# Patient Record
Sex: MALE | Race: BLACK OR AFRICAN AMERICAN | Employment: OTHER | URBAN - METROPOLITAN AREA
[De-identification: names, ages, dates, MRNs, and addresses within clinical notes are randomized per-mention and may not be internally consistent; named-entity substitution may affect disease eponyms.]

---

## 2017-09-25 ENCOUNTER — OFFICE VISIT (OUTPATIENT)
Dept: ORTHOPEDIC SURGERY | Age: 67
End: 2017-09-25

## 2017-09-25 VITALS
BODY MASS INDEX: 29.4 KG/M2 | HEIGHT: 68 IN | DIASTOLIC BLOOD PRESSURE: 88 MMHG | HEART RATE: 68 BPM | RESPIRATION RATE: 18 BRPM | SYSTOLIC BLOOD PRESSURE: 155 MMHG | TEMPERATURE: 98.2 F | WEIGHT: 194 LBS

## 2017-09-25 DIAGNOSIS — M54.16 LUMBAR RADICULOPATHY: ICD-10-CM

## 2017-09-25 DIAGNOSIS — M54.50 LUMBAR SPINE PAIN: Primary | ICD-10-CM

## 2017-09-25 RX ORDER — TAMSULOSIN HYDROCHLORIDE 0.4 MG/1
CAPSULE ORAL
Refills: 3 | COMMUNITY
Start: 2017-08-02

## 2017-09-25 RX ORDER — ALLOPURINOL 100 MG/1
TABLET ORAL DAILY
COMMUNITY

## 2017-09-25 RX ORDER — SIMVASTATIN 40 MG/1
TABLET, FILM COATED ORAL
COMMUNITY

## 2017-09-25 RX ORDER — LOSARTAN POTASSIUM 100 MG/1
TABLET ORAL
Refills: 5 | COMMUNITY
Start: 2017-09-15

## 2017-09-25 RX ORDER — COLCHICINE 0.6 MG/1
0.6 TABLET ORAL AS NEEDED
COMMUNITY

## 2017-09-25 NOTE — PATIENT INSTRUCTIONS
[employer] Activation    Thank you for requesting access to [employer]. Please follow the instructions below to securely access and download your online medical record. [employer] allows you to send messages to your doctor, view your test results, renew your prescriptions, schedule appointments, and more. How Do I Sign Up? 1. In your internet browser, go to [email or web address]  2. Click on the First Time User? Click Here link in the Sign In box. You will be redirect to the New Member Sign Up page. 3. Enter your [employer] Access Code exactly as it appears below. You will not need to use this code after youve completed the sign-up process. If you do not sign up before the expiration date, you must request a new code. [employer] Access Code: [email or web address]  Expires: 2017  8:31 AM (This is the date your [employer] access code will )    4. Enter the last four digits of your Social Security Number (xxxx) and Date of Birth (mm/dd/yyyy) as indicated and click Submit. You will be taken to the next sign-up page. 5. Create a [employer] ID. This will be your [employer] login ID and cannot be changed, so think of one that is secure and easy to remember. 6. Create a [employer] password. You can change your password at any time. 7. Enter your Password Reset Question and Answer. This can be used at a later time if you forget your password. 8. Enter your e-mail address. You will receive e-mail notification when new information is available in [address]. 9. Click Sign Up. You can now view and download portions of your medical record. 10. Click the Download Summary menu link to download a portable copy of your medical information. Additional Information    If you have questions, please visit the Frequently Asked Questions section of the [employer] website at [email or web address] Remember, [employer] is NOT to be used for urgent needs. For medical emergencies, dial 911.          Learning About Lumbar Epidural Steroid Injections  What is a lumbar epidural steroid injection? A doctor may give you a lumbar epidural steroid injection to try to decrease pain, tingling, or numbness in your back, buttock, or leg. These might be the result of a back or disc problem. The injection goes directly into your epidural space. This is the area in your back around your spinal cord. This injection may have both a local anesthetic and a steroid medicine. Or it may only have a steroid. Local anesthetic medicines numb your nerves right away for a short time. Steroids reduce swelling and pain. But they take a few days to start working. Some people get a series of these injections over weeks or months. How is a lumbar epidural done? The doctor may use an imaging test before or during your injection. This can be an MRI, a CT scan, or an X-ray. These tests can show where your nerve problems are. After finding the right spot, the doctor may inject a numbing medicine into the skin where you will get the steroid injection. Then he or she puts the needle for the steroid into the numbed area. You may feel some pressure. You could feel some stinging or burning during the injection. It takes about 10 to 15 minutes to get this injection. You will probably go home about 20 to 30 minutes after you get it. You will need someone to drive you home. What can you expect after a lumbar epidural?  If your injection had local anesthetic and a steroid, your legs may feel heavy or numb right after. You will probably be able to walk. But you may need to be extra careful. Take care not to lose your balance and be sure to follow your doctor's instructions. If your injection contained local anesthetic, you may feel better right away. But this pain relief will last only a few hours. Your pain will probably return. This is because the steroids have not started working yet. Before the steroids start to work, your back may be sore for a few days.   These injections don't always work. When they do, it takes 1 to 5 days. This pain relief can last for several days to a few months or longer. You may want to do less than normal for a few days. But you may also be able to return to your daily routine. Some people are dizzy or feel sick to their stomach after getting this injection. These symptoms usually do not last very long. If your pain is better, you may be able to keep doing your normal activities or physical therapy. But try not to overdo it, even if your back pain has improved a lot. If your pain is only a little better or if it comes back, your doctor may recommend another injection in a few weeks. If your pain has not changed, talk to your doctor about other treatment choices. Side effects from an epidural steroid injection include headache, fever, or infection. Serious side effects are rare. But they can include stroke, paralysis, or loss of vision. Follow-up care is a key part of your treatment and safety. Be sure to make and go to all appointments, and call your doctor if you are having problems. It's also a good idea to know your test results and keep a list of the medicines you take. Where can you learn more? Go to http://rosey-chris.info/. Enter [place] in the search box to learn more about \"Learning About Lumbar Epidural Steroid Injections. \"  Current as of: March 21, 2017  Content Version: 11.3  © 3936-1424 [employer]. Care instructions adapted under license by [employer] (which disclaims liability or warranty for this information). If you have questions about a medical condition or this instruction, always ask your healthcare professional. [address] any warranty or liability for your use of this information.

## 2017-09-25 NOTE — PROGRESS NOTES
[address]  [address]  [address]  Phone: [phone]  Fax: [phone]        Dulce Orellana  : 1950  PCP: Cathryn Nava MD  2017    NEW PATIENT      ASSESSMENT AND PLAN     Sonia Guerrero comes in to the office today c/o low back pain with sharp radicular symptoms into the right lower extremity. The lumbar MRI showed foraminal stenosis at the bilateral L5 and S1 nerve roots. On the examination, he did not have neurological deficits. His symptoms are right sided. I referred him to get a right L5 and S1 SNRB. I increased his Gabapentin to 600 mg TID. Pt will f/u in 3 weeks or sooner if needed. Diagnoses and all orders for this visit:    1. Lumbar spine pain    2. Lumbar radiculopathy  -     SCHEDULE SURGERY         Follow-up Disposition:  Return in about 3 weeks (around 10/16/2017), or if symptoms worsen or fail to improve. CHIEF COMPLAINT  Sonia Guerrero is seen today in consultation at the request of Cathryn Nava MD for complaints of low back pain. HISTORY OF PRESENT ILLNESS  Sonia Guerrero is a 75 y.o. male c/o low back pain with sharp radicular symptoms into the right lower extremity that has been gradually worsening over the last year. He reports intermittent numbness and tingling in the lower extremity. He notes a recent ED visit approximately 2 months ago due to severe pain located at the right calf which cause ambulatory issues. He is currently on Gabapentin 600 mg BID, which provided mild relief. From a functional standpoint, he is unable to stand for a prolonged period of time. He notes constantly changing positions to relieve his pain. He is a previous patient of Dr. Dylon Tavares who preformed a laminectomy/discectomy at the left L4-5 level. Pt denies any fevers, chills, nausea, vomiting. Pt denies any chest pain and shortness of breath. Pt denies any ear, nose, and throat problems. Pt denies any fecal or urinary incontinence. He is retired. PAST MEDICAL HISTORY   No past medical history on file. No past surgical history on file. MEDICATIONS        ALLERGIES  No Known Allergies       SOCIAL HISTORY    Social History     Social History    Marital status: UNKNOWN     Spouse name: N/A    Number of children: N/A    Years of education: N/A     Social History Main Topics    Smoking status: Former Smoker     Quit date: 1/1/1987    Smokeless tobacco: Never Used    Alcohol use No    Drug use: None    Sexual activity: Not Asked     Other Topics Concern    None     Social History Narrative    None       FAMILY HISTORY  No family history on file. REVIEW OF SYSTEMS  Review of Systems   Constitutional: Negative for chills, diaphoresis, fever, malaise/fatigue and weight loss. Respiratory: Negative for shortness of breath. Cardiovascular: Negative for chest pain and leg swelling. Gastrointestinal: Negative for constipation, nausea and vomiting. Neurological: Negative for dizziness, tingling, seizures, loss of consciousness, weakness and headaches. Psychiatric/Behavioral: The patient does not have insomnia. PHYSICAL EXAMINATION  Visit Vitals    /88    Pulse 68    Temp 98.2 °F (36.8 °C)    Resp 18    Ht 5' 8\" (1.727 m)    Wt 194 lb (88 kg)    BMI 29.5 kg/m2         Pain Assessment  9/25/2017   Location of Pain Leg;Hip;Back; Foot   Location Modifiers Right   Severity of Pain 0   Duration of Pain A few hours   Frequency of Pain Intermittent         Constitutional:  Well developed, well nourished, in no acute distress. Psychiatric: Affect and mood are appropriate. HEENT: Normocephalic, atraumatic. Extraocular movements intact. Integumentary: No rashes or abrasions noted on exposed areas. Cardiovascular: Regular rate and rhythm. Pulmonary: Clear to auscultation bilaterally. SPINE/MUSCULOSKELETAL EXAM    Cervical spine:  Neck is midline. Normal muscle tone. No focal atrophy is noted. ROM pain free. Shoulder ROM intact. No tenderness to palpation. Negative Spurling's sign. Negative Tinel's sign. Negative Howell's sign. Sensation in the bilateral arms grossly intact to light touch. Lumbar spine:  No rash, ecchymosis, or gross obliquity. No fasciculations. No focal atrophy is noted. No pain with hip ROM. Full range of motion. No tenderness to palpation. No tenderness to palpation at the sciatic notch. SI joints non-tender. Trochanters non tender. Sensation in the bilateral legs grossly intact to light touch. MOTOR:      Biceps  Triceps Deltoids Wrist Ext Wrist Flex Hand Intrin   Right 5/5 5/5 5/5 5/5 5/5 5/5   Left 5/5 5/5 5/5 5/5 5/5 5/5             Hip Flex  Quads Hamstrings Ankle DF EHL Ankle PF   Right 5/5 5/5 5/5 5/5 5/5 5/5   Left 5/5 5/5 5/5 5/5 5/5 5/5     DTRs are 1+ biceps, triceps, brachioradialis, patella, and Achilles. Negative Straight Leg raise. Squat not tested. No difficulty with tandem gait. Ambulation without assistive device. FWB. RADIOGRAPHS  Lumbar MRI images taken on 08/25/2017 personally reviewed with patient:  Impression:  1. Moderate circumferential disc bulge and endplate spondylosis at L4-L5 with impingement of left L5 and abutment of descending right L5 nerve roots. Severe right and left foraminal stenosis, each with impingement of the respective L4 foraminal nerve roots. 2. Moderate multifactorial canal stenosis at L5-S1 without descending S1 nerve root impingement. Severe right and left L5-S1 foraminal stenosis with impingement of left and right L5 foraminal nerve roots.  reviewed    Mr. Rony Chavez has a reminder for a \"due or due soon\" health maintenance. I have asked that he contact his primary care provider for follow-up on this health maintenance. This plan was reviewed with the patient and patient agrees. All questions were answered. More than half of this visit today was spent on counseling. Written by Faustino Montgomery, as dictated by Dr. Britta Rojas. I, Dr. Britta Rojas, confirm that all documentation is accurate.

## 2017-09-25 NOTE — MR AVS SNAPSHOT
Visit Information Date & Time Provider Department Dept. Phone Encounter #  
 9/25/2017  9:00 AM Pawel Mensah MD [facility] [phone] [id number] Follow-up Instructions Return in about 3 weeks (around 10/16/2017), or if symptoms worsen or fail to improve. Follow-up and Disposition History Upcoming Health Maintenance Date Due Hepatitis C Screening 1950 DTaP/Tdap/Td series (1 - Tdap) 2/10/1970 FOBT Q 1 YEAR AGE 50-75 2/10/1999 ZOSTER VACCINE AGE 60> 12/10/2008 GLAUCOMA SCREENING Q2Y 2/10/2014 Pneumococcal 65+ Low/Medium Risk (1 of 2 - PCV13) 2/10/2014 MEDICARE YEARLY EXAM 2/10/2014 INFLUENZA AGE 9 TO ADULT 8/1/2017 Allergies as of 9/25/2017  Review Complete On: 9/25/2017 By: Pawel Mensah MD  
 No Known Allergies Current Immunizations  Never Reviewed No immunizations on file. Not reviewed this visit You Were Diagnosed With   
  
 Codes Comments Lumbar spine pain    -  Primary ICD-10-CM: M54.5 ICD-9-CM: 724.2 Lumbar radiculopathy     ICD-10-CM: M54.16 
ICD-9-CM: 724.4 Vitals BP Pulse Temp Resp Height(growth percentile) Weight(growth percentile) 155/88 68 98.2 °F (36.8 °C) 18 5' 8\" (1.727 m) 194 lb (88 kg) BMI Smoking Status 29.5 kg/m2 Former Smoker BMI and BSA Data Body Mass Index Body Surface Area  
 29.5 kg/m 2 2.05 m 2 Preferred Pharmacy Pharmacy Name Phone [address], [address] [facility] 22 [address] [facility] [phone] Your Updated Medication List  
  
   
This list is accurate as of: 9/25/17 10:30 AM.  Always use your most recent med list.  
  
  
  
  
 allopurinol 100 mg tablet Commonly known as:  Kenyatta Groom Take  by mouth daily. colchicine 0.6 mg tablet Take 0.6 mg by mouth daily. losartan 100 mg tablet Commonly known as:  COZAAR TK 1 T PO QD  
  
 simvastatin 40 mg tablet Commonly known as:  ZOCOR Take  by mouth nightly. tamsulosin 0.4 mg capsule Commonly known as:  FLOMAX TK 1 C PO D BEFORE SUPPER We Performed the Following SCHEDULE SURGERY [FCD6009 Custom] Follow-up Instructions Return in about 3 weeks (around 10/16/2017), or if symptoms worsen or fail to improve. Patient Instructions [employer]Nav [employer] Activation Thank you for requesting access to [employer]. Please follow the instructions below to securely access and download your online medical record. [employer] allows you to send messages to your doctor, view your test results, renew your prescriptions, schedule appointments, and more. How Do I Sign Up? 1. In your internet browser, go to [email or web address] 
2. Click on the First Time User? Click Here link in the Sign In box. You will be redirect to the New Member Sign Up page. 3. Enter your [employer] Access Code exactly as it appears below. You will not need to use this code after youve completed the sign-up process. If you do not sign up before the expiration date, you must request a new code. [employer] Access Code: [email or web address] Expires: 2017  8:31 AM (This is the date your [employer] access code will ) 4. Enter the last four digits of your Social Security Number (xxxx) and Date of Birth (mm/dd/yyyy) as indicated and click Submit. You will be taken to the next sign-up page. 5. Create a [employer] ID. This will be your [employer] login ID and cannot be changed, so think of one that is secure and easy to remember. 6. Create a [employer] password. You can change your password at any time. 7. Enter your Password Reset Question and Answer. This can be used at a later time if you forget your password. 8. Enter your e-mail address. You will receive e-mail notification when new information is available in [address] 9. Click Sign Up. You can now view and download portions of your medical record. 10. Click the Download Summary menu link to download a portable copy of your medical information. Additional Information If you have questions, please visit the Frequently Asked Questions section of the [employer] website at https://[email or web address] Internet Services. Kid Bunch/Net[employer]s Internet Services/. Remember, [employer] is NOT to be used for urgent needs. For medical emergencies, dial 911. Learning About Lumbar Epidural Steroid Injections What is a lumbar epidural steroid injection? A doctor may give you a lumbar epidural steroid injection to try to decrease pain, tingling, or numbness in your back, buttock, or leg. These might be the result of a back or disc problem. The injection goes directly into your epidural space. This is the area in your back around your spinal cord. This injection may have both a local anesthetic and a steroid medicine. Or it may only have a steroid. Local anesthetic medicines numb your nerves right away for a short time. Steroids reduce swelling and pain. But they take a few days to start working. Some people get a series of these injections over weeks or months. How is a lumbar epidural done? The doctor may use an imaging test before or during your injection. This can be an MRI, a CT scan, or an X-ray. These tests can show where your nerve problems are. After finding the right spot, the doctor may inject a numbing medicine into the skin where you will get the steroid injection. Then he or she puts the needle for the steroid into the numbed area. You may feel some pressure. You could feel some stinging or burning during the injection. It takes about 10 to 15 minutes to get this injection. You will probably go home about 20 to 30 minutes after you get it. You will need someone to drive you home. What can you expect after a lumbar epidural? 
If your injection had local anesthetic and a steroid, your legs may feel heavy or numb right after. You will probably be able to walk.  But you may need to be extra careful. Take care not to lose your balance and be sure to follow your doctor's instructions. If your injection contained local anesthetic, you may feel better right away. But this pain relief will last only a few hours. Your pain will probably return. This is because the steroids have not started working yet. Before the steroids start to work, your back may be sore for a few days. These injections don't always work. When they do, it takes 1 to 5 days. This pain relief can last for several days to a few months or longer. You may want to do less than normal for a few days. But you may also be able to return to your daily routine. Some people are dizzy or feel sick to their stomach after getting this injection. These symptoms usually do not last very long. If your pain is better, you may be able to keep doing your normal activities or physical therapy. But try not to overdo it, even if your back pain has improved a lot. If your pain is only a little better or if it comes back, your doctor may recommend another injection in a few weeks. If your pain has not changed, talk to your doctor about other treatment choices. Side effects from an epidural steroid injection include headache, fever, or infection. Serious side effects are rare. But they can include stroke, paralysis, or loss of vision. Follow-up care is a key part of your treatment and safety. Be sure to make and go to all appointments, and call your doctor if you are having problems. It's also a good idea to know your test results and keep a list of the medicines you take. Where can you learn more? Go to http://rosey-chris.info/. Enter Lynnette Eddy in the search box to learn more about \"Learning About Lumbar Epidural Steroid Injections. \" Current as of: March 21, 2017 Content Version: 11.3 © 3302-4834 [employer].  Care instructions adapted under license by [address] (which disclaims liability or warranty for this information). If you have questions about a medical condition or this instruction, always ask your healthcare professional. [address] any warranty or liability for your use of this information. Patient Instructions History Introducing \[facility]\"" & HEALTH SERVICES! Syeda Jacome introduces Chatous patient portal. Now you can access parts of your medical record, email your doctor's office, and request medication refills online. 1. In your internet browser, go to [email or web address] 2. Click on the First Time User? Click Here link in the Sign In box. You will see the New Member Sign Up page. 3. Enter your Chatous Access Code exactly as it appears below. You will not need to use this code after youve completed the sign-up process. If you do not sign up before the expiration date, you must request a new code. · Chat[employer] Access Code: [email or web address] Expires: 12/24/2017  8:31 AM 
 
4. Enter the last four digits of your Social Security Number (xxxx) and Date of Birth (mm/dd/yyyy) as indicated and click Submit. You will be taken to the next sign-up page. 5. Create a Chatous ID. This will be your Chatous login ID and cannot be changed, so think of one that is secure and easy to remember. 6. Create a Chatous password. You can change your password at any time. 7. Enter your Password Reset Question and Answer. This can be used at a later time if you forget your password. 8. Enter your e-mail address. You will receive e-mail notification when new information is available in [address] 9. Click Sign Up. You can now view and download portions of your medical record. 10. Click the Download Summary menu link to download a portable copy of your medical information.  
 
If you have questions, please visit the Frequently Asked Questions section of the [employer]. Remember, [employer] is NOT to be used for urgent needs. For medical emergencies, dial 911. Now available from your iPhone and Android! Please provide this summary of care documentation to your next provider. Your primary care clinician is listed as Sol Mena. If you have any questions after today's visit, please call [phone].

## 2017-10-23 ENCOUNTER — TELEPHONE (OUTPATIENT)
Dept: ORTHOPEDIC SURGERY | Age: 67
End: 2017-10-23

## 2017-10-23 NOTE — TELEPHONE ENCOUNTER
Patient called requesting a call back from Dr. Kaye Hedrick. He said it's in regards to his spinal block scheduled for 10/25/17. Please advise patient at [phone] or [phone].

## 2017-10-25 ENCOUNTER — APPOINTMENT (OUTPATIENT)
Dept: GENERAL RADIOLOGY | Age: 67
End: 2017-10-25
Attending: PHYSICAL MEDICINE & REHABILITATION
Payer: MEDICARE

## 2017-10-25 ENCOUNTER — HOSPITAL ENCOUNTER (OUTPATIENT)
Age: 67
Setting detail: OUTPATIENT SURGERY
Discharge: HOME | End: 2017-10-25
Attending: PHYSICAL MEDICINE & REHABILITATION | Admitting: PHYSICAL MEDICINE & REHABILITATION
Payer: MEDICARE

## 2017-10-25 VITALS
BODY MASS INDEX: 29.4 KG/M2 | RESPIRATION RATE: 16 BRPM | SYSTOLIC BLOOD PRESSURE: 143 MMHG | HEART RATE: 51 BPM | WEIGHT: 194 LBS | HEIGHT: 68 IN | DIASTOLIC BLOOD PRESSURE: 95 MMHG | OXYGEN SATURATION: 100 % | TEMPERATURE: 98.1 F

## 2017-10-25 PROBLEM — M54.16 LUMBAR RADICULOPATHY: Status: ACTIVE | Noted: 2017-10-25

## 2017-10-25 PROCEDURE — 74011250636 HC RX REV CODE- 250/636

## 2017-10-25 PROCEDURE — 77030003669 HC NDL SPN COOK -B: Performed by: PHYSICAL MEDICINE & REHABILITATION

## 2017-10-25 PROCEDURE — 76010000009 HC PAIN MGT 0 TO 30 MIN PROC: Performed by: PHYSICAL MEDICINE & REHABILITATION

## 2017-10-25 PROCEDURE — 74011636320 HC RX REV CODE- 636/320

## 2017-10-25 PROCEDURE — 74011000250 HC RX REV CODE- 250: Performed by: PHYSICAL MEDICINE & REHABILITATION

## 2017-10-25 PROCEDURE — 77030003676 HC NDL SPN MPRI -A: Performed by: PHYSICAL MEDICINE & REHABILITATION

## 2017-10-25 PROCEDURE — 74011250636 HC RX REV CODE- 250/636: Performed by: PHYSICAL MEDICINE & REHABILITATION

## 2017-10-25 PROCEDURE — 74011000250 HC RX REV CODE- 250

## 2017-10-25 PROCEDURE — 74011636320 HC RX REV CODE- 636/320: Performed by: PHYSICAL MEDICINE & REHABILITATION

## 2017-10-25 RX ORDER — GUAIFENESIN 100 MG/5ML
81 LIQUID (ML) ORAL DAILY
COMMUNITY

## 2017-10-25 RX ORDER — DEXAMETHASONE SODIUM PHOSPHATE 100 MG/10ML
INJECTION INTRAMUSCULAR; INTRAVENOUS AS NEEDED
Status: DISCONTINUED | OUTPATIENT
Start: 2017-10-25 | End: 2017-10-25 | Stop reason: HOSPADM

## 2017-10-25 RX ORDER — DIAZEPAM 5 MG/1
5-20 TABLET ORAL ONCE
Status: DISCONTINUED | OUTPATIENT
Start: 2017-10-25 | End: 2017-10-25 | Stop reason: HOSPADM

## 2017-10-25 RX ORDER — LIDOCAINE HYDROCHLORIDE 10 MG/ML
INJECTION, SOLUTION EPIDURAL; INFILTRATION; INTRACAUDAL; PERINEURAL AS NEEDED
Status: DISCONTINUED | OUTPATIENT
Start: 2017-10-25 | End: 2017-10-25 | Stop reason: HOSPADM

## 2017-10-25 NOTE — DISCHARGE INSTRUCTIONS
[facility]   ([place])  Dr. Maria G Duvall, Dr. Richelle Hensley, Dr. Radha Boudreaux not drive a car, operate heavy machinery or dangerous equipment for 24 hours. * Activity as tolerated; rest for the remainder of the day. * Resume pre-block medications including those for your family doctor. * Do not drink alcoholic beverages for 24 hours. Alcohol and the medications you have received may interact and cause an adverse reaction. * You may feel better this evening and worse tomorrow, as the numbing medications wears off and the steroid has yet to begin to work. After 48 hrs the steroid should begin to release bringing you relief. * You may shower this evening and remove any bandages. * Avoid hot tubs and heating pads for 24 hours. You may use cold packs on the procedure site as tolerated for the first 24 hours. * If a headache develops, drink plenty of fluids and rest.  Take over the counter medications for headache if needed. If the headache continues longer than 24 hours, call MD at the [address] [place] [address]. [phone]    * Continue taking pain medications as needed. * You may resume your regular diet if tolerated. Otherwise, start with sips of water and advance slowly. * If Diabetic: check your blood sugar three times a day for the next 3 days. If your sugar is greater than 300 call your family doctor. If your sugar is greater than 400, have someone transport you to the nearest Emergency Room. * If you experience any of the following problems, Please Call the [address] [place] [address] at [phone].         * Shortness of Breath    * Fever of 101 or higher    * Nausea / Vomiting    * Severe Headache    * Weakness or numbness in arms or legs that is not      resolving    * Prolonged increase in pain greater than 4 days      DISCHARGE SUMMARY from Nurse      PATIENT INSTRUCTIONS:    After oral sedation, for 24 hours or while taking prescription Narcotics:  · Limit your activities  · Do not drive and operate hazardous machinery  · Do not make important personal or business decisions  · Do  not drink alcoholic beverages  · If you have not urinated within 8 hours after discharge, please contact your surgeon on call. Report the following to your surgeon:  · Excessive pain, swelling, redness or odor of or around the surgical area  · Temperature over 101  · Nausea and vomiting lasting longer than 4 hours or if unable to take medications  · Any signs of decreased circulation or nerve impairment to extremity: change in color, persistent  numbness, tingling, coldness or increase pain  · Any questions            What to do at Home:  Recommended activity: Activity as tolerated, NO DRIVING FOR 12 Hours post injection          *  Please give a list of your current medications to your Primary Care Provider. *  Please update this list whenever your medications are discontinued, doses are      changed, or new medications (including over-the-counter products) are added. *  Please carry medication information at all times in case of emergency situations. These are general instructions for a healthy lifestyle:    No smoking/ No tobacco products/ Avoid exposure to second hand smoke    Surgeon General's Warning:  Quitting smoking now greatly reduces serious risk to your health. Obesity, smoking, and sedentary lifestyle greatly increases your risk for illness    A healthy diet, regular physical exercise & weight monitoring are important for maintaining a healthy lifestyle    You may be retaining fluid if you have a history of heart failure or if you experience any of the following symptoms:  Weight gain of 3 pounds or more overnight or 5 pounds in a week, increased swelling in our hands or feet or shortness of breath while lying flat in bed.   Please call your doctor as soon as you notice any of these symptoms; do not wait until your next office visit. Recognize signs and symptoms of STROKE:    F-face looks uneven    A-arms unable to move or move unevenly    S-speech slurred or non-existent    T-time-call 911 as soon as signs and symptoms begin-DO NOT go       Back to bed or wait to see if you get better-TIME IS BRAIN. MyChart Activation    Thank you for requesting access to [employer]. Please follow the instructions below to securely access and download your online medical record. [employer] allows you to send messages to your doctor, view your test results, renew your prescriptions, schedule appointments, and more. How Do I Sign Up? 1. In your internet browser, go to [email or web address]  2. Click on the First Time User? Click Here link in the Sign In box. You will be redirect to the New Member Sign Up page. 3. Enter your [employer] Access Code exactly as it appears below. You will not need to use this code after youve completed the sign-up process. If you do not sign up before the expiration date, you must request a new code. [employer] Access Code: [email or web address]  Expires: 2017  8:31 AM (This is the date your [employer] access code will )    4. Enter the last four digits of your Social Security Number (xxxx) and Date of Birth (mm/dd/yyyy) as indicated and click Submit. You will be taken to the next sign-up page. 5. Create a [employer] ID. This will be your [employer] login ID and cannot be changed, so think of one that is secure and easy to remember. 6. Create a [employer] password. You can change your password at any time. 7. Enter your Password Reset Question and Answer. This can be used at a later time if you forget your password. 8. Enter your e-mail address. You will receive e-mail notification when new information is available in [address] 9. Click Sign Up. You can now view and download portions of your medical record.   10. Click the Download Summary menu link to download a portable copy of your medical information. Additional Information    If you have questions, please visit the Frequently Asked Questions section of the [employer] website at [email or web address] Remember, [employer] is NOT to be used for urgent needs. For medical emergencies, dial 911.

## 2017-10-25 NOTE — PROCEDURES
PROCEDURE NOTE      Patient Name: Casey Johnson    Date of Procedure: October 25, 2017    Preoperative Diagnosis:  Lumbar radiculopathy [M54.16]    Post Operative Diagnosis:  Lumbar radiculopathy [M54.16]    Procedure :    right L5 Selective Nerve Root Block  right S1 Selective Nerve Root Block      Consent:  Informed consent was obtained prior to the procedure. The patient was given the opportunity to ask questions regarding the procedure and its associated risks. In addition to the potential risks associated with the procedure itself, the patient was informed both verbally and in writing of the potential side effects of the use of glucocorticoid. The patient appeared to comprehend the informed consent and desired to have the procedure performed. Procedure: The patient was placed in the prone position on the fluoroscopy table and the back was prepped and draped in the usual sterile manner. The exact spinal level was  identified using fluoroscopy, and Lidocaine 1 % was injected locally, a # 22 gauge spinal needle was passed to the transverse process. The depth was noted and the needle redirected to pass inferior and approximately one cm anterior to the transverse process. YES  1 cc of Isovue M-200 was used to verify positioning in the epidural and paravertebral space and outlined the course of the spinal nerve into the epidural space. The same procedure was repeated at each spinal level indicated above. A total of 30 mg of preservative free Dexamethasone and 5 cc of Lidocaine was slowly injected. The patient tolerated the procedure well. The injection area was cleaned and bandaids applied. Not excessive bleeding was noted. Patient dressed and discharged to home with instructions. Discussion: The patient tolerated the procedure well.                                               Sara Carrillo MD  October 25, 2017

## 2017-10-25 NOTE — H&P
Date of Surgery Update:  Be Britton was seen and examined. History and physical has been reviewed. The patient has been examined. There have been no significant clinical changes since the completion of the last office visit.       Signed By: Xin Jernigan MD     October 25, 2017 12:18 PM

## 2017-10-26 NOTE — TELEPHONE ENCOUNTER
Called patient, apologized for delay in response. Patient expressed that he really wanted to speak with MD and it was explained to him how the messages are handled. He was not happy about office process and an apology was given to patient. He was asked about his current status after the procedure, he stated, he is doing well.  He didn't want to elaborate on what he was previously wanting to ask the MD. He will f/u on 11/08/17

## 2017-11-08 ENCOUNTER — OFFICE VISIT (OUTPATIENT)
Dept: ORTHOPEDIC SURGERY | Age: 67
End: 2017-11-08

## 2017-11-08 VITALS
WEIGHT: 192.4 LBS | HEART RATE: 60 BPM | DIASTOLIC BLOOD PRESSURE: 95 MMHG | BODY MASS INDEX: 29.25 KG/M2 | TEMPERATURE: 97.5 F | SYSTOLIC BLOOD PRESSURE: 181 MMHG | OXYGEN SATURATION: 99 %

## 2017-11-08 DIAGNOSIS — M54.16 LUMBAR RADICULOPATHY: Primary | ICD-10-CM

## 2017-11-08 RX ORDER — GABAPENTIN 300 MG/1
CAPSULE ORAL
COMMUNITY
Start: 2017-08-02 | End: 2018-02-05 | Stop reason: SDUPTHER

## 2017-11-08 NOTE — PATIENT INSTRUCTIONS

## 2017-11-08 NOTE — PROGRESS NOTES
[address]  [place], [address] [place] [address] Street  Phone: [phone]  Fax: [phone]        Esmer Badillo  : 1950  PCP: Dasia Alberto MD  2017    PROGRESS NOTE      ASSESSMENT AND PLAN    Carrol Ham comes in to the office today for a right L5 and S1 SNRB f/u. He was primarily treated for lumbar radiculopathy. He is currently asymptomatic. At this time, we discussed tapering off of the Gabapentin. For the next two weeks he will take 300 mg TID then discontinue fully. I advised him to start a general exercise program.     Pt will f/u in 3 months or sooner as needed. Diagnoses and all orders for this visit:    1. Lumbar radiculopathy       Follow-up Disposition:  Return in about 3 months (around 2018), or if symptoms worsen or fail to improve. HISTORY OF PRESENT ILLNESS  Carrol Ham is a 75 y.o. male. A&P / HPI from 2017:  Allyson Gaines comes in to the office today c/o low back pain with sharp radicular symptoms into the right lower extremity.      The lumbar MRI showed foraminal stenosis at the bilateral L5 and S1 nerve roots. On the examination, he did not have neurological deficits. His symptoms are right sided.     I referred him to get a right L5 and S1 SNRB. I increased his Gabapentin to 600 mg TID. HISTORY OF PRESENT ILLNESS  Allyson Gaines is a 75 y.o. male c/o low back pain with sharp radicular symptoms into the right lower extremity that has been gradually worsening over the last year. He reports intermittent numbness and tingling in the lower extremity. He notes a recent ED visit approximately 2 months ago due to severe pain located at the right calf which cause ambulatory issues. He is currently on Gabapentin 600 mg BID, which provided mild relief. From a functional standpoint, he is unable to stand for a prolonged period of time. He notes constantly changing positions to relieve his pain.  He is a previous patient of Dr. Bambi Vasquez who preformed a laminectomy/discectomy at the left L4-5 level.      Pt denies any fevers, chills, nausea, vomiting. Pt denies any chest pain and shortness of breath. Pt denies any ear, nose, and throat problems. Pt denies any fecal or urinary incontinence.      He is retired.        Updates from 11/08/17:  Pt presents for a right L5 and S1 SNRB f/u. The procedure provided continue long term relief. His pain today is rated at an 0/10. PAST MEDICAL HISTORY   Past Medical History:   Diagnosis Date    Gout     High cholesterol     Hypertension        Past Surgical History:   Procedure Laterality Date    HX GI      hemorroidectomy    HX OTHER SURGICAL      back surgery bulged disc   . MEDICATIONS      Current Outpatient Prescriptions   Medication Sig Dispense Refill    gabapentin (NEURONTIN) 300 mg capsule TAKE 1 CAPSULE BY MOUTH THREE TIMES DAILY      aspirin 81 mg chewable tablet Take 81 mg by mouth daily.  losartan (COZAAR) 100 mg tablet TK 1 T PO QD  5    tamsulosin (FLOMAX) 0.4 mg capsule TK 1 C PO D BEFORE SUPPER  3    simvastatin (ZOCOR) 40 mg tablet Take  by mouth nightly.  allopurinol (ZYLOPRIM) 100 mg tablet Take  by mouth daily.  colchicine 0.6 mg tablet Take 0.6 mg by mouth daily. ALLERGIES  No Known Allergies       SOCIAL HISTORY    Social History     Social History    Marital status:      Spouse name: N/A    Number of children: N/A    Years of education: N/A     Social History Main Topics    Smoking status: Former Smoker     Quit date: 1/1/1987    Smokeless tobacco: Never Used    Alcohol use No    Drug use: None    Sexual activity: Not Asked     Other Topics Concern    None     Social History Narrative       FAMILY HISTORY  History reviewed. No pertinent family history. REVIEW OF SYSTEMS  Review of Systems   Constitutional: Negative for chills, diaphoresis, fever, malaise/fatigue and weight loss. Respiratory: Negative for shortness of breath. Cardiovascular: Negative for chest pain and leg swelling. Gastrointestinal: Negative for constipation, nausea and vomiting. Neurological: Negative for dizziness, tingling, seizures, loss of consciousness, weakness and headaches. Psychiatric/Behavioral: The patient does not have insomnia. PHYSICAL EXAMINATION  Visit Vitals    BP (!) 181/95    Pulse 60    Temp 97.5 °F (36.4 °C) (Oral)    Wt 192 lb 6.4 oz (87.3 kg)    SpO2 99%    BMI 29.25 kg/m2       Pain Assessment  11/8/2017   Location of Pain Back   Location Modifiers -   Severity of Pain 0   Duration of Pain -   Frequency of Pain Intermittent   Result of Injury No           Constitutional:  Well developed, well nourished, in no acute distress. Psychiatric: Affect and mood are appropriate. Integumentary: No rashes or abrasions noted on exposed areas. SPINE/MUSCULOSKELETAL EXAM    Cervical spine:  Neck is midline. Normal muscle tone. No focal atrophy is noted. ROM pain free. Shoulder ROM intact. No tenderness to palpation. Negative Spurling's sign. Negative Tinel's sign. Negative Howell's sign.       Sensation in the bilateral arms grossly intact to light touch.      Lumbar spine:  No rash, ecchymosis, or gross obliquity. No fasciculations. No focal atrophy is noted. No pain with hip ROM. Full range of motion. No tenderness to palpation. No tenderness to palpation at the sciatic notch. SI joints non-tender. Trochanters non tender.      Sensation in the bilateral legs grossly intact to light touch.     MOTOR:      Biceps  Triceps Deltoids Wrist Ext Wrist Flex Hand Intrin   Right 5/5 5/5 5/5 5/5 5/5 5/5   Left 5/5 5/5 5/5 5/5 5/5 5/5             Hip Flex  Quads Hamstrings Ankle DF EHL Ankle PF   Right 5/5 5/5 5/5 5/5 5/5 5/5   Left 5/5 5/5 5/5 5/5 5/5 5/5     DTRs are 1+ biceps, triceps, brachioradialis, patella, and Achilles.     Negative Straight Leg raise.   Squat not tested. No difficulty with tandem gait.      Ambulation without assistive device. FWB.       RADIOGRAPHS  Lumbar MRI images taken on 08/25/2017 personally reviewed with patient:  Impression:  1. Moderate circumferential disc bulge and endplate spondylosis at L4-L5 with impingement of left L5 and abutment of descending right L5 nerve roots. Severe right and left foraminal stenosis, each with impingement of the respective L4 foraminal nerve roots. 2. Moderate multifactorial canal stenosis at L5-S1 without descending S1 nerve root impingement. Severe right and left L5-S1 foraminal stenosis with impingement of left and right L5 foraminal nerve roots.       reviewed     Mr. Maine Mccurdy has a reminder for a \"due or due soon\" health maintenance. I have asked that he contact his primary care provider for follow-up on this health maintenance.      This plan was reviewed with the patient and patient agrees. All questions were answered. More than half of this visit today was spent on counseling.     Written by Stan Cervantes, as dictated by Dr. Rooney Essex, Dr. Ashley Hickman, confirm that all documentation is accurate.

## 2018-02-05 ENCOUNTER — OFFICE VISIT (OUTPATIENT)
Dept: ORTHOPEDIC SURGERY | Age: 68
End: 2018-02-05

## 2018-02-05 VITALS
BODY MASS INDEX: 30.1 KG/M2 | SYSTOLIC BLOOD PRESSURE: 154 MMHG | RESPIRATION RATE: 17 BRPM | TEMPERATURE: 97.9 F | WEIGHT: 198.6 LBS | HEART RATE: 60 BPM | DIASTOLIC BLOOD PRESSURE: 106 MMHG | OXYGEN SATURATION: 97 % | HEIGHT: 68 IN

## 2018-02-05 DIAGNOSIS — M54.12 CERVICAL RADICULOPATHY: Primary | ICD-10-CM

## 2018-02-05 RX ORDER — PREDNISONE 10 MG/1
10 TABLET ORAL SEE ADMIN INSTRUCTIONS
Qty: 21 TAB | Refills: 0 | Status: SHIPPED | OUTPATIENT
Start: 2018-02-05 | End: 2018-03-26 | Stop reason: ALTCHOICE

## 2018-02-05 RX ORDER — GABAPENTIN 300 MG/1
300 CAPSULE ORAL 3 TIMES DAILY
Qty: 90 CAP | Refills: 2 | Status: SHIPPED | OUTPATIENT
Start: 2018-02-05 | End: 2018-08-28 | Stop reason: SDUPTHER

## 2018-02-05 NOTE — PROGRESS NOTES
[address]  [address], [address]  [place], [address] [place] [address] Street  Phone: [phone]  Fax: [phone]        Artis Hard  : 1950  PCP: Rony Stout MD  2018    PROGRESS NOTE      ASSESSMENT AND PLAN    Seven Lazo comes in to the office today for left arm pain in a roughly C6 distribution. MRI shows multilevel foraminal stenosis; oddly moreso on the R while his symptoms are L-sided. He has clear complaints of C6/C7 radicular pain without any weakness or loss of sensation. Based on MRI results and symptoms, I discussed physical therapy vs SNRB, but he deferred SNRB for now. Advised him to pursue more aggressive treatment if he begins to experience weakness or loss of sensation. Will refer to physical therapy for MDT and increase gabapentin to TID and a prednisone pack. Pt will f/u in 6 weeks or sooner as needed. Diagnoses and all orders for this visit:    1. Cervical radiculopathy  -     REFERRAL TO PHYSICAL THERAPY         Follow-up Disposition: Not on File      HISTORY OF PRESENT ILLNESS  Seven Lazo is a 75 y.o. male. A&P / HPI from 2017:  Kenyatta Ramos comes in to the office today c/o low back pain with sharp radicular symptoms into the right lower extremity.       The lumbar MRI showed foraminal stenosis at the bilateral L5 and S1 nerve roots. On the examination, he did not have neurological deficits. His symptoms are right sided.      I referred him to get a right L5 and S1 SNRB. I increased his Gabapentin to 600 mg TID. Updates from 17:  Pt presents for a right L5 and S1 SNRB f/u. The procedure provided continue long term relief. His pain today is rated at an 0/10. Updates from 18:  Pt presents for pain radiating into his left arm in a C6 distribution. His pain has been intermittent for over a year, worse and more frequent in the past several weeks.  He reports his pain worsened when he decreased his gabapentin to BID. PAST MEDICAL HISTORY   Past Medical History:   Diagnosis Date    Gout     High cholesterol     Hypertension        Past Surgical History:   Procedure Laterality Date    HX GI      hemorroidectomy    HX OTHER SURGICAL      back surgery bulged disc   . MEDICATIONS    Current Outpatient Prescriptions   Medication Sig Dispense Refill    gabapentin (NEURONTIN) 300 mg capsule TAKE 1 CAPSULE BY MOUTH THREE TIMES DAILY      aspirin 81 mg chewable tablet Take 81 mg by mouth daily.  losartan (COZAAR) 100 mg tablet TK 1 T PO QD  5    tamsulosin (FLOMAX) 0.4 mg capsule TK 1 C PO D BEFORE SUPPER  3    simvastatin (ZOCOR) 40 mg tablet Take  by mouth nightly.  allopurinol (ZYLOPRIM) 100 mg tablet Take  by mouth daily.  colchicine 0.6 mg tablet Take 0.6 mg by mouth daily. ALLERGIES  No Known Allergies       SOCIAL HISTORY    Social History     Social History    Marital status:      Spouse name: N/A    Number of children: N/A    Years of education: N/A     Social History Main Topics    Smoking status: Former Smoker     Quit date: 1/1/1987    Smokeless tobacco: Never Used    Alcohol use No    Drug use: Not on file    Sexual activity: Not on file     Other Topics Concern    Not on file     Social History Narrative       FAMILY HISTORY  No family history on file. REVIEW OF SYSTEMS  Review of Systems   Neurological:        Radicular pain in L arm          PHYSICAL EXAMINATION  There were no vitals taken for this visit. Pain Assessment  11/8/2017   Location of Pain Back   Location Modifiers -   Severity of Pain 0   Duration of Pain -   Frequency of Pain Intermittent   Result of Injury No           Constitutional:  Well developed, well nourished, in no acute distress. Psychiatric: Affect and mood are appropriate. Integumentary: No rashes or abrasions noted on exposed areas. SPINE/MUSCULOSKELETAL EXAM  Cervical spine:  Neck is midline. Normal muscle tone. No focal atrophy is noted. ROM pain free. Shoulder ROM intact. No tenderness to palpation. Positive Left Spurling's sign. Negative Tinel's sign. Negative Howell's sign.       Sensation in the bilateral arms grossly intact to light touch.         Lumbar spine:  No rash, ecchymosis, or gross obliquity. No fasciculations. No focal atrophy is noted. No pain with hip ROM. Full range of motion. No tenderness to palpation. No tenderness to palpation at the sciatic notch. SI joints non-tender. Trochanters non tender.      Sensation in the bilateral legs grossly intact to light touch. MOTOR:      Biceps  Triceps Deltoids Wrist Ext Wrist Flex Hand Intrin   Right 5/5 5/5 5/5 5/5 5/5 5/5   Left 5/5 5/5 5/5 5/5 5/5 5/5             Hip Flex  Quads Hamstrings Ankle DF EHL Ankle PF   Right 5/5 5/5 5/5 5/5 5/5 5/5   Left 5/5 5/5 5/5 5/5 5/5 5/5     Cervical Spine MRI from 1/18/18 reviewed with patient in office today:  1. Multilevel disc disease producing various degree of canal stenosis, overall relatively mild. No moderate or high-grade stenosis. 2. Moderate to marked foraminal stenosis at multiple levels. Particular concern for nerve root impingement at C3, C5, C6, C7 on the R, and C7 on the L.   3. High T2 signal cleft through the anterior C5-6 disc suggesting a tear of the disc as may be seen with a hyperextension injury, although there are no other stigmata of such.      Written by Micha Alejandro as dictated by Amos Gilford, MD

## 2018-02-05 NOTE — MR AVS SNAPSHOT
[address] [place] [address] [address] [facility] [address] 
[phone] Patient: Liberty Darnell MRN: [id number] CDR:[id number] Visit Information Date & Time Provider Department Dept. Phone Encounter #  
 2/5/2018  8:45 AM Jose Armando Greene MD [facility] [phone] [id number] Follow-up Instructions Return in about 6 weeks (around 3/19/2018). Upcoming Health Maintenance Date Due Hepatitis C Screening 1950 DTaP/Tdap/Td series (1 - Tdap) 2/10/1970 FOBT Q 1 YEAR AGE 50-75 2/10/1999 ZOSTER VACCINE AGE 60> 12/10/2008 GLAUCOMA SCREENING Q2Y 2/10/2014 Pneumococcal 65+ Low/Medium Risk (1 of 2 - PCV13) 2/10/2014 MEDICARE YEARLY EXAM 2/10/2014 Influenza Age 5 to Adult 8/1/2017 Allergies as of 2/5/2018  Review Complete On: 2/5/2018 By: Lizzette Salamanca No Known Allergies Current Immunizations  Never Reviewed No immunizations on file. Not reviewed this visit You Were Diagnosed With   
  
 Codes Comments Cervical radiculopathy    -  Primary ICD-10-CM: M54.12 
ICD-9-CM: 723.4 Vitals BP Pulse Temp Resp Height(growth percentile) Weight(growth percentile) (!) 154/106 60 97.9 °F (36.6 °C) (Oral) 17 5' 8\" (1.727 m) 198 lb 9.6 oz (90.1 kg) SpO2 BMI Smoking Status 97% 30.2 kg/m2 Former Smoker BMI and BSA Data Body Mass Index Body Surface Area  
 30.2 kg/m 2 2.08 m 2 Preferred Pharmacy Pharmacy Name Phone [address] [place] [address] [phone] [facility] [phone] Your Updated Medication List  
  
   
This list is accurate as of: 2/5/18 10:07 AM.  Always use your most recent med list.  
  
  
  
  
 allopurinol 100 mg tablet Commonly known as:  Jalaine Peeks Take  by mouth daily. aspirin 81 mg chewable tablet Take 81 mg by mouth daily. colchicine 0.6 mg tablet Take 0.6 mg by mouth daily. gabapentin 300 mg capsule Commonly known as:  NEURONTIN Take 1 Cap by mouth three (3) times daily. losartan 100 mg tablet Commonly known as:  COZAAR TK 1 T PO QD  
  
 predniSONE 10 mg dose pack Commonly known as:  STERAPRED DS Take 1 Tab by mouth See Admin Instructions. See administration instruction per 10mg dose pack  
  
 simvastatin 40 mg tablet Commonly known as:  ZOCOR Take  by mouth nightly. tamsulosin 0.4 mg capsule Commonly known as:  FLOMAX TK 1 C PO D BEFORE SUPPER Prescriptions Sent to Pharmacy Refills  
 predniSONE (STERAPRED DS) 10 mg dose pack 0 Sig: Take 1 Tab by mouth See Admin Instructions. See administration instruction per 10mg dose pack Class: Normal  
 Pharmacy: [address] [place] [address] Ph #: [phone] Route: Oral  
 gabapentin (NEURONTIN) 300 mg capsule 2 Sig: Take 1 Cap by mouth three (3) times daily. Class: Normal  
 Pharmacy: [address] [place] [address] Ph #: [phone] Route: Oral  
  
We Performed the Following REFERRAL TO PHYSICAL THERAPY [CQZ62 Custom] Comments:  
 eval and treat Left cervical radiculopathy Follow-up Instructions Return in about 6 weeks (around 3/19/2018). Referral Information Referral ID Referred By Referred To  
  
 [id number] [facility] Not Available Visits Status Start Date End Date 1 New Request 2/5/18 2/5/19 If your referral has a status of pending review or denied, additional information will be sent to support the outcome of this decision. Introducing [facility]! Artur Hernandez introduces Clean Mobile patient portal. Now you can access parts of your medical record, email your doctor's office, and request medication refills online. 1. In your internet browser, go to [email or web address] 2. Click on the First Time User? Click Here link in the Sign In box. You will see the New Member Sign Up page. 3. Enter your Involver Access Code exactly as it appears below. You will not need to use this code after youve completed the sign-up process. If you do not sign up before the expiration date, you must request a new code. · Involver Access Code: [email or web address] Expires: 5/6/2018 10:07 AM 
 
4. Enter the last four digits of your Social Security Number (xxxx) and Date of Birth (mm/dd/yyyy) as indicated and click Submit. You will be taken to the next sign-up page. 5. Create a CarePaymentt ID. This will be your Involver login ID and cannot be changed, so think of one that is secure and easy to remember. 6. Create a Involver password. You can change your password at any time. 7. Enter your Password Reset Question and Answer. This can be used at a later time if you forget your password. 8. Enter your e-mail address. You will receive e-mail notification when new information is available in [address] 9. Click Sign Up. You can now view and download portions of your medical record. 10. Click the Download Summary menu link to download a portable copy of your medical information. If you have questions, please visit the Frequently Asked Questions section of the Involver website. Remember, Involver is NOT to be used for urgent needs. For medical emergencies, dial 911. Now available from your iPhone and Android! Please provide this summary of care documentation to your next provider. Your primary care clinician is listed as Lexi Oates. If you have any questions after today's visit, please call [phone].

## 2018-02-08 ENCOUNTER — HOSPITAL ENCOUNTER (OUTPATIENT)
Dept: PHYSICAL THERAPY | Age: 68
Discharge: HOME | End: 2018-02-08
Payer: MEDICARE

## 2018-02-08 PROCEDURE — 97161 PT EVAL LOW COMPLEX 20 MIN: CPT

## 2018-02-08 PROCEDURE — G8984 CARRY CURRENT STATUS: HCPCS

## 2018-02-08 PROCEDURE — G8979 MOBILITY GOAL STATUS: HCPCS

## 2018-02-08 PROCEDURE — 97110 THERAPEUTIC EXERCISES: CPT

## 2018-02-08 NOTE — PROGRESS NOTES
Physical Therapy Evaluation Cervical Spine - [facility]    SUBJECTIVE  Pain Level (0-10 scale): 1  []constant []intermittent []improving []worsening []no change since onset    Any medication changes, allergies to medications, adverse drug reactions, diagnosis change, or new procedure performed?: [x] No    [] Yes (see summary sheet for update)  Subjective functional status/changes:     PLOF: yardwork,  duties; L hand dominant  Limitations to PLOF: L UE pain  Mechanism of Injury: One year - insidious onset. He saw his PCM when it first started, and it started with his L LE. He started gabapentin, and it helped; he also has a history of R LE pain, that sent him to the ED; he had the R hip injection, and stopped taking the gabapentin. His left side flared up again, Dr. Moy Badillo put him on the gabapentin. Current symptoms/Complaints: L UE radiculopathy into L thumb/index; worse with sitting/computer, relief with B KRYSTAL  Previous Treatment/Compliance: PT for back, reports it did not help his back  PMHx/Surgical Hx: Lumbar discectomy, R hip injection  Work Hx: Retired  Living Situation: Two story home; when the L UE radiculopathy flares up, his arm feels heavy, but he is able to still use it  Pt Goals: \"Less pains in my neck and arm. \"  Barriers: []pain []financial []time []transportation []other  Motivation: Good  Substance use: []Alcohol []Tobacco []other:   FABQ Score: []low []elevate  Cognition: A & O x 4    Other:Good health status    OBJECTIVE/EXAMINATION    Symptoms  Aggravated by:   [] Bending [] Sitting [] Standing [] Reaching Overhead   [x] Moving [] Cough [] Sneeze [] Eating   [] AM  [] PM  Lying:  [] sup   [] pro   [] sidelying   [x] Other: turning to L     Eased by:    [] Bending [] Sitting [] Standing Lying: [x] sup  [x] pro  [] sidelying   [] Moving [] AM  [] PM  [] Other:     General Health:  Red Flags Indicated? [] Yes    [x] No  [] Yes [x] No Recent weight change (If yes, due to dieting?  [] Yes [] No)   [] Yes [x] No Persistent cough  [] Yes [x] No Unremitting pain at night  [] Yes [x] No Dizziness  [] Yes [x] No Blurred vision  [] Yes [x] No Hands more cold or painful in cold weather  [] Yes [x] No Ringing in ears hearing aids  [] Yes [x] No Difficulty swallowing  [] Yes [x] No Dysfunction of bowel or bladder  [] Yes [x] No Recent illness within past 3 weeks (i.e, cold, flu)  [] Yes [x] No Jaw pain    Headaches: Do you have headaches? [] Yes   [x] No  How often do you get headaches? How long does the headache last?  What aggravates it? What relieves it? Does the headache coincide with any other symptoms (visual disturbances, light sensitivity)? Where is the headache? Does it change locations?   Other:    OBJECTIVE  Posture: [] WNL  Head Position: Slight L tilt  Shoulder/Scapular Position:  C-Kyphosis:  [] increased   [] decreased   C-Lordosis:   [x] increased   [] decreased  T-Kyphosis:  [x] increased   [] decreased  T-Lordosis:   [] increased   [] decreased     TMJ: [x] N/A [] Abnormal - ROM:   Palpation:    Cervical Retraction: [] WNL    [x] Abnormal: 50%    Shoulder/Scapular Screen: [x] WNL    [] Abnormal:    Active Movements: [] N/A   [] Too acute   [] Other:  ROM AROM MMT Comments:pain, area   Forward flexion 50 5    Extension 35 5    SB right 17 5    SB left  11 5    Rotation right 62 5    Rotation left 57 5      Thoracic Spine: [x] N/A    [] WNL   [] Other:    PROM: N/A    Palpation: denies TTP  [] Min  [] Mod  [] Severe    Location:  [] Min  [] Mod  [] Severe    Location:  [] Min  [] Mod  [] Severe    Location:    Neuro Screen (myotome/dematome/felexes): [] WNL  Myotome Level Muscle Test Myotome Level Muscle Test   C5 Shoulder Adduction - Deltoid C8 Finger Flexors   C6 Wrist Extension T1 Finger Abduction - Interossei   C7 Elbow Extension     Comments:  Upper Limb Tension Tests: [] N/A       Ulnar: [x] R    [x] L    [] +    [x] -       Median: [x] R    [x] L    [] +    [x] -       Radial: [x] R    [x] L    [] +    [x] -    Special Tests:  Cervical:        Vertebral Artery:  [] R    [] L    [] +    [] -       Alar Ligament: [x] R    [x] L    [] +    [x] -       Sharp Eugenio: [x] R    [x] L    [] +    [x] -       Spurling's:  [x] R    [x] L    [] +    [x] -       Distraction:  [] R    [] L    [] +    [] -       Compression: [] R    [] L    [] +    [] -  Bakody's:  Self report of relief of symptoms. Thoracic Outlet Tests: [] N/A       Adson's:  [] R    [] L    [] +    [] -       Hyperabduction: [] R    [] L    [] +    [] -       Lucas's:  [] R    [] L    [] +    [] -       Yolanda:  [] R    [] L    [] +    [] -    Diaphragmatic Breathing: [] Normal    [] Abnormal    Muscle Flexibility: [] N/A   Scalenes: [] WNL    [] Tight    [] R    [] L   Upper Trap: [] WNL    [x] Tight    [] R    [x] L   Levator: [] WNL    [x] Tight    [] R    [x] L   Pect. Minor: [] WNL    [x] Tight    [] R    [x] L    Global Muscular Weakness: [] N/A   Lower Trap:   Rhomboids:   Middle Trap:   Serratus Ant:   Ext Rotators:    Other:    Other tests/comments:

## 2018-02-08 NOTE — PROGRESS NOTES
[facility]  [address]  [place], [address]  [phone] fax    Plan of Care/ Statement of Necessity for Physical Therapy Services    Patient name: Rich Frausto Start of Care: 2018   Referral source: Sumaya Jimenez MD : 1950    Medical Diagnosis: Radiculopathy, cervical region [M54.12]   Onset Date:    Treatment Diagnosis: Cervical radiculopathy   Prior Hospitalization: see medical history Provider#: [id number]   Medications: Verified on Patient summary List    Comorbidities: Hearing impaired, lumbar discectomy, HBP, R hip injection   Prior Level of Function: L hand dominant, yard work and  duties      The Plan of Care and following information is based on the information from the initial evaluation. Assessment/ key information: Pt is a 75 y.o. Male presenting with chronic L cervical radiculopathy into C6 distribution; limitations noted in posture, myofascial restrictions into L pecs/UT/LS, 50% cervical retraction, L cervical SB = 11 degrees, and FOTO score of 66. Pt would benefit from skilled PT intervention to address the above listed limitations and allow improved functional task completion.     Evaluation Complexity History MEDIUM  Complexity : 1-2 comorbidities / personal factors will impact the outcome/ POC ; Examination LOW Complexity : 1-2 Standardized tests and measures addressing body structure, function, activity limitation and / or participation in recreation  ;Presentation LOW Complexity : Stable, uncomplicated  ;Clinical Decision Making MEDIUM Complexity : FOTO score of 26-74  Overall Complexity Rating: LOW   Problem List: pain affecting function, decrease ROM, decrease ADL/ functional abilitiies, decrease activity tolerance and decrease flexibility/ joint mobility   Treatment Plan may include any combination of the following: Therapeutic exercise, Therapeutic activities, Neuromuscular re-education, Physical agent/modality, Manual therapy, Patient education, Self Care training, Functional mobility training and Home safety training  Patient / Family readiness to learn indicated by: asking questions and trying to perform skills  Persons(s) to be included in education: patient (P)  Barriers to Learning/Limitations: None  Patient Goal (s): Less pains in my neck and arm.   Patient Self Reported Health Status: good  Rehabilitation Potential: good    Short Term Goals: To be accomplished in two weeks:   1. Pt will report <50% of the time L UE radiculopathy when driving or on the computer to improve quality of life. 2.  Pt will demonstrate L cervical SB to 15 degrees or more to improve ADLs. Long Term Goals: To be accomplished in four weeks:   1. Pt will demonstrate FOTO to 71 or greater to indicate improved functional task completion. 2.  Pt will demonstrate B cervical SB to 20 degrees or more to improve ADLs. 3.  Pt will report ability to drive without instances of L UE radiculopathy to improve quality of life. Frequency / Duration: Patient to be seen 2 times per week for 4 weeks. Patient/ Caregiver education and instruction: Diagnosis, prognosis, self care, activity modification and exercises   [x]  Plan of care has been reviewed with PTA    G-Codes (GP)  Carry   Current  CJ= 20-39%    Goal  CJ= 20-39%  The severity rating is based on clinical judgment and the FOTO score. Certification Period: 60 days (02/08/2018 - 04/05/2018)  Michael Venegas, PT 2/8/2018 1:32 PM    ________________________________________________________________________    I certify that the above Therapy Services are being furnished while the patient is under my care. I agree with the treatment plan and certify that this therapy is necessary.     [de-identified] Signature:____________________  Date:____________Time: _________    Please sign and return to In [address]  [address] [place] [address] [address]  [phone] fax

## 2018-02-08 NOTE — PROGRESS NOTES
PT DAILY TREATMENT NOTE - [facility] 3-16    Patient Name: Carole Briones  Date:2018  : 1950  [x]  Patient  Verified  Payor: Miroslava Butler / Plan: [address] [place] [address] HMO / Product Type: Managed Care Medicare /    In time:1217  Out time:1252  Total Treatment Time (min): 35  Total Timed Codes (min): 8  1:1 Treatment Time ( only): 35   Visit #: 1 of 8    Treatment Area: Radiculopathy, cervical region [M54.12]    SUBJECTIVE  Pain Level (0-10 scale): 1  Any medication changes, allergies to medications, adverse drug reactions, diagnosis change, or new procedure performed?: [x] No    [] Yes (see summary sheet for update)  Subjective functional status/changes:   [] No changes reported  Mechanism of Injury: One year - insidious onset. He saw his PCM when it first started, and it started with his L LE. He started gabapentin, and it helped; he also has a history of R LE pain, that sent him to the ED; he had the R hip injection, and stopped taking the gabapentin. His left side flared up again, Dr. Jose Fermin put him on the gabapentin. Current symptoms/Complaints: L UE radiculopathy into L thumb/index; worse with sitting/computer, relief with B KRYSTAL    OBJECTIVE    27 min [x]Eval                  []Re-Eval     8 min Therapeutic Exercise:  [x] See flow sheet :   Rationale: increase ROM and increase strength to improve the patients ability to perform ADLs; Reviewed with pt involved anatomy and pathology, treatment plan, and goals. With   [] TE   [] TA   [] neuro   [] other: Patient Education: [x] Review HEP    [] Progressed/Changed HEP based on:   [] positioning   [] body mechanics   [] transfers   [] heat/ice application    [] other:      Other Objective/Functional Measures: see eval.     Pain Level (0-10 scale) post treatment: 1    ASSESSMENT/Changes in Function:   Pt is a 75 y.o.  Male presenting with chronic L cervical radiculopathy into C6 distribution; limitations noted in posture, myofascial restrictions into L pecs/UT/LS, 50% cervical retraction, L cervical SB = 11 degrees, and FOTO score of 66. Pt would benefit from skilled PT intervention to address the above listed limitations and allow improved functional task completion. Patient will continue to benefit from skilled PT services to modify and progress therapeutic interventions, address functional mobility deficits, address ROM deficits, address strength deficits, analyze and address soft tissue restrictions, analyze and cue movement patterns, analyze and modify body mechanics/ergonomics, assess and modify postural abnormalities and instruct in home and community integration to attain remaining goals. [x]  See Plan of Care  []  See progress note/recertification  []  See Discharge Summary         Progress towards goals / Updated goals:  Short Term Goals: To be accomplished in two weeks:                        1.  Pt will report <50% of the time L UE radiculopathy when driving or on the computer to improve quality of life. 2.  Pt will demonstrate L cervical SB to 15 degrees or more to improve ADLs. Long Term Goals: To be accomplished in four weeks:                        1.  Pt will demonstrate FOTO to 71 or greater to indicate improved functional task completion. 2.  Pt will demonstrate B cervical SB to 20 degrees or more to improve ADLs. 3.  Pt will report ability to drive without instances of L UE radiculopathy to improve quality of life.     PLAN  [x]  Upgrade activities as tolerated     [x]  Continue plan of care  [x]  Update interventions per flow sheet       []  Discharge due to:_  []  Other:_      Francisco Puentes, PT 2/8/2018  1:36 PM    Future Appointments  Date Time Provider Meeta Wade   2/13/2018 10:00 AM [facility]   2/15/2018 10:00 AM [facility]   3/19/2018 9:30 AM Abel Ahn  [address]

## 2018-02-13 ENCOUNTER — HOSPITAL ENCOUNTER (OUTPATIENT)
Dept: PHYSICAL THERAPY | Age: 68
Discharge: HOME | End: 2018-02-13
Payer: MEDICARE

## 2018-02-13 PROCEDURE — 97110 THERAPEUTIC EXERCISES: CPT

## 2018-02-13 NOTE — PROGRESS NOTES
PT DAILY TREATMENT NOTE - [facility]     Patient Name: Sanjiv Son  Date:2018  : 1950  [x]  Patient  Verified  Payor: Kalani Molina / Plan: [address] [place] [address] HMO / Product Type: Managed Care Medicare /    In time:10:00  Out time:10:40  Total Treatment Time (min): 40  Total Timed Codes (min): 40  1:1 Treatment Time ( [address] only): 22   Visit #: 2 of 8    Treatment Area: Radiculopathy, cervical region [M54.12]    SUBJECTIVE  Pain Level (0-10 scale): 0/10  Any medication changes, allergies to medications, adverse drug reactions, diagnosis change, or new procedure performed?: [x] No    [] Yes (see summary sheet for update)  Subjective functional status/changes:   [] No changes reported  Pt denies any pain since last treatment. OBJECTIVE    32 min Therapeutic Exercise:  [x] See flow sheet :   Rationale: increase ROM and increase strength to improve the patients ability to tolerate ADLs. 8 min Manual Therapy:  SOR,  OA MET; MFR to zheng   Rationale: decrease pain, increase ROM and increase tissue extensibility to improve tolerance to functional activities. With   [] TE   [] TA   [] neuro   [] other: Patient Education: [x] Review HEP    [] Progressed/Changed HEP based on:   [] positioning   [] body mechanics   [] transfers   [] heat/ice application    [] other:      Other Objective/Functional Measures: initiated exercises as per flow sheet. Pain Level (0-10 scale) post treatment: 010    ASSESSMENT/Changes in Function: No significant change in symptom. Pt has radicular symptoms into L UE with supine pec stretch, symptoms subside immediately. Patient will continue to benefit from skilled PT services to modify and progress therapeutic interventions, address functional mobility deficits, address ROM deficits, address strength deficits, analyze and address soft tissue restrictions and analyze and cue movement patterns to attain remaining goals.      []  See Plan of Care  []  See progress note/recertification  []  See Discharge Summary         Progress towards goals / Updated goals:  Short Term Goals: To be accomplished in two weeks:                        9.  Pt will report <50% of the time L UE radiculopathy when driving or on the computer to improve quality of life.                        2.  Pt will demonstrate L cervical SB to 15 degrees or more to improve ADLs. Long Term Goals: To be accomplished in [address]:                        1.  Pt will demonstrate FOTO to 70 or greater to indicate improved functional task completion.                        7.  Pt will demonstrate B cervical SB to 20 degrees or more to improve ADLs.                         7.  Pt will report ability to drive without instances of L UE radiculopathy to improve quality of life.       PLAN  []  Upgrade activities as tolerated     [x]  Continue plan of care  []  Update interventions per flow sheet       []  Discharge due to:_  []  Other:_      Guru Dickens PTA 2/13/2018  10:18 AM    Future Appointments  Date Time Provider Meeta Grimaldoi   2/15/2018 10:00 AM Guru Dickens PTA [facility]   3/19/2018 9:30 AM Talha Hays  [address]

## 2018-02-15 ENCOUNTER — HOSPITAL ENCOUNTER (OUTPATIENT)
Dept: PHYSICAL THERAPY | Age: 68
Discharge: HOME | End: 2018-02-15
Payer: MEDICARE

## 2018-02-15 PROCEDURE — 97110 THERAPEUTIC EXERCISES: CPT

## 2018-02-15 PROCEDURE — 97140 MANUAL THERAPY 1/> REGIONS: CPT

## 2018-02-15 NOTE — PROGRESS NOTES
PT DAILY TREATMENT NOTE     Patient Name: Doron Cons  Date:2/15/2018  : 1950  [x]  Patient  Verified  Payor: Maura Monterroso / Plan: [address] [place] [address] HMO / Product Type: Managed Care Medicare /    In time:10:01  Out time:10:38  Total Treatment Time (min): 37  Total Timed Codes (min): 37  1:1 Treatment Time ( [address] only): 27   Visit #: 3 of 8    Treatment Area: Radiculopathy, cervical region [M54.12]    SUBJECTIVE  Pain Level (0-10 scale): 0/10  Any medication changes, allergies to medications, adverse drug reactions, diagnosis change, or new procedure performed?: [x] No    [] Yes (see summary sheet for update)  Subjective functional status/changes:   [] No changes reported  Pt reports no significant change in symptoms. OBJECTIVE    29 min Therapeutic Exercise:  [x] See flow sheet :   Rationale: increase ROM and increase strength to improve the patients ability to tolerate ADLs. 8 min Manual Therapy:  Per flow sheet   Rationale: decrease pain, increase ROM and increase tissue extensibility to improve tolerance to ADLs. With   [] TE   [] TA   [] neuro   [] other: Patient Education: [x] Review HEP    [] Progressed/Changed HEP based on:   [] positioning   [] body mechanics   [] transfers   [] heat/ice application    [] other:      Other Objective/Functional Measures: Constant vc's for form with all exercises. Pain Level (0-10 scale) post treatment: 0/10    ASSESSMENT/Changes in Function: Pt demonstrates fair carry over with all vc's. Pt has no increased symptoms with therapeutic activities. Patient will continue to benefit from skilled PT services to modify and progress therapeutic interventions, address functional mobility deficits, address ROM deficits, address strength deficits, analyze and address soft tissue restrictions, analyze and cue movement patterns and analyze and modify body mechanics/ergonomics to attain remaining goals.      []  See Plan of Care  []  See progress note/recertification  []  See Discharge Summary         Progress towards goals / Updated goals:  Short Term Goals: To be accomplished in two weeks:                        2.  Pt will report <50% of the time L UE radiculopathy when driving or on the computer to improve quality of life.                        2.  Pt will demonstrate L cervical SB to 15 degrees or more to improve ADLs. Long Term Goals: To be accomplished in [address]:                        6.  Pt will demonstrate FOTO to 70 or greater to indicate improved functional task completion.                        1.  Pt will demonstrate B cervical SB to 20 degrees or more to improve ADLs.                         3.  Pt will report ability to drive without instances of L UE radiculopathy to improve quality of life.       PLAN  []  Upgrade activities as tolerated     [x]  Continue plan of care  []  Update interventions per flow sheet       []  Discharge due to:_  []  Other:_      Juan J Sagastume, PTA 2/15/2018  11:04 AM    Future Appointments  Date Time Provider Meeta Wade   2/20/2018 11:00 AM [facility] PHYSICAL THERAPY 1 [facility]   2/22/2018 9:00 AM Juan J Sagastume, PTA [facility]   2/27/2018 9:30 AM Juan J Endo, PTA [facility]   3/1/2018 1:00 PM Juan J Endo, PTA [facility]   3/6/2018 9:30 AM Juan J Endo, PTA [facility]   3/19/2018 9:30 AM Kiki Ritchie  [address]

## 2018-02-20 ENCOUNTER — HOSPITAL ENCOUNTER (OUTPATIENT)
Dept: PHYSICAL THERAPY | Age: 68
Discharge: HOME | End: 2018-02-20
Payer: MEDICARE

## 2018-02-20 PROCEDURE — 97140 MANUAL THERAPY 1/> REGIONS: CPT

## 2018-02-20 PROCEDURE — 97110 THERAPEUTIC EXERCISES: CPT

## 2018-02-20 NOTE — PROGRESS NOTES
PT DAILY TREATMENT NOTE - [facility]     Patient Name: Kay Peacock  Date:2018  : 1950  [x]  Patient  Verified  Payor: Edwarmoiz Chester / Plan: [address] [place] [address] HMO / Product Type: Managed Care Medicare /    In time:11:04  Out time:11:42  Total Treatment Time (min): 38  Total Timed Codes (min): 38  1:1 Treatment Time ( [address] only): 45   Visit #: 4 of 8    Treatment Area: Radiculopathy, cervical region [M54.12]    SUBJECTIVE  Pain Level (0-10 scale): 0/10  Any medication changes, allergies to medications, adverse drug reactions, diagnosis change, or new procedure performed?: [x] No    [] Yes (see summary sheet for update)  Subjective functional status/changes:   [] No changes reported  The patient states that his neck is feeling better and does feel that it is moving better. He indicates that his hand is having less numbness, but still evident. OBJECTIVE  20 min Therapeutic Exercise:  [x] See flow sheet :   Rationale: increase ROM and increase strength to improve the patients ability to improve ADL ease. 10 min Neuromuscular Re-education:  [x]  See flow sheet :   Rationale: increase ROM and increase strength  to improve the patients ability to improve ADL ease. 8 min Manual Therapy:  SOR, scalene stretch, UT/LS TPR    Rationale: decrease pain, increase ROM and increase tissue extensibility to improve ADL ease. With   [] TE   [] TA   [] neuro   [] other: Patient Education: [x] Review HEP    [] Progressed/Changed HEP based on:   [] positioning   [] body mechanics   [] transfers   [] heat/ice application    [] other:      Other Objective/Functional Measures:   Requires significant cues in order to perform chin tuck correctly. Pain Level (0-10 scale) post treatment: 0/10    ASSESSMENT/Changes in Function: The patient is progressing fairly well with mobility. He will continue to benefit from PT in order to progress strengthening and reduce peripheralization.      Patient will continue to benefit from skilled PT services to modify and progress therapeutic interventions, address functional mobility deficits, address ROM deficits, address strength deficits, analyze and address soft tissue restrictions, analyze and cue movement patterns, analyze and modify body mechanics/ergonomics, assess and modify postural abnormalities and instruct in home and community integration to attain remaining goals. []  See Plan of Care  []  See progress note/recertification  []  See Discharge Summary         Progress towards goals / Updated goals:  Short Term Goals: To be accomplished in two weeks:                        5.  Pt will report <50% of the time L UE radiculopathy when driving or on the computer to improve quality of life.                        2.  Pt will demonstrate L cervical SB to 15 degrees or more to improve ADLs. Long Term Goals: To be accomplished in [address]:                        5.  Pt will demonstrate FOTO to 70 or greater to indicate improved functional task completion.                        5.  Pt will demonstrate B cervical SB to 20 degrees or more to improve ADLs.                       2.  Pt will report ability to drive without instances of L UE radiculopathy to improve quality of life.     PLAN  []  Upgrade activities as tolerated     [x]  Continue plan of care  []  Update interventions per flow sheet       []  Discharge due to:_  []  Other:_      Dianelys Tolentino, PT 2/20/2018  1:09 PM    Future Appointments  Date Time Provider Meeta Wade   2/22/2018 9:00 AM Jessenia Roxann, PTA [facility]   2/27/2018 9:30 AM Jessenia Roxann, PTA [facility]   3/1/2018 1:00 PM Jessenia Roxann, PTA [facility]   3/6/2018 9:30 AM Jessenia Roxann, PTA [facility]   3/19/2018 9:30 AM Mayte Travis  [address]

## 2018-02-22 ENCOUNTER — HOSPITAL ENCOUNTER (OUTPATIENT)
Dept: PHYSICAL THERAPY | Age: 68
Discharge: HOME | End: 2018-02-22
Payer: MEDICARE

## 2018-02-22 PROCEDURE — 97140 MANUAL THERAPY 1/> REGIONS: CPT

## 2018-02-22 PROCEDURE — 97110 THERAPEUTIC EXERCISES: CPT

## 2018-02-22 NOTE — PROGRESS NOTES
PT DAILY TREATMENT NOTE - [facility]     Patient Name: Roe Morales  Date:2018  : 1950  [x]  Patient  Verified  Payor: Mina Brown / Plan: [address] [place] [address] HMO / Product Type: Managed Care Medicare /    In time:9:01  Out time:9:33  Total Treatment Time (min): 32  Total Timed Codes (min): 32  1:1 Treatment Time ( [address] only): 28   Visit #: 5 of 8    Treatment Area: Radiculopathy, cervical region [M54.12]    SUBJECTIVE  Pain Level (0-10 scale): 0/10  Any medication changes, allergies to medications, adverse drug reactions, diagnosis change, or new procedure performed?: [x] No    [] Yes (see summary sheet for update)  Subjective functional status/changes:   [] No changes reported  \"I can't tell if the therapy is helping because the symptoms had stopped before coming here. \"    OBJECTIVE    24 min Therapeutic Exercise:  [x] See flow sheet :   Rationale: increase ROM and increase strength to improve the patients ability to perform functional activities. 8 min Manual Therapy:  SOR, MFR to scalenes, O/A MET   Rationale: decrease pain, increase ROM and increase tissue extensibility to improve tolerance to functional activities. With   [] TE   [] TA   [] neuro   [] other: Patient Education: [x] Review HEP    [] Progressed/Changed HEP based on:   [] positioning   [] body mechanics   [] transfers   [] heat/ice application    [] other:      Other Objective/Functional Measures: Pt demonstrates good form with all exercises. Pain Level (0-10 scale) post treatment: 0/10    ASSESSMENT/Changes in Function: Pt guards during manual treatment requiring constant vc's to reduce guarding making it difficult to assess PROM.      Patient will continue to benefit from skilled PT services to modify and progress therapeutic interventions, address functional mobility deficits, address ROM deficits, address strength deficits, analyze and address soft tissue restrictions, analyze and cue movement patterns and analyze and modify body mechanics/ergonomics to attain remaining goals. []  See Plan of Care  []  See progress note/recertification  []  See Discharge Summary         Progress towards goals / Updated goals:  Short Term Goals: To be accomplished in two weeks:                        4.  Pt will report <50% of the time L UE radiculopathy when driving or on the computer to improve quality of life.                        2.  Pt will demonstrate L cervical SB to 15 degrees or more to improve ADLs. Long Term Goals: To be accomplished in [address]:                        6.  Pt will demonstrate FOTO to 70 or greater to indicate improved functional task completion.                        8.  Pt will demonstrate B cervical SB to 20 degrees or more to improve ADLs.                       7.  Pt will report ability to drive without instances of L UE radiculopathy to improve quality of life.     PLAN  []  Upgrade activities as tolerated     [x]  Continue plan of care  []  Update interventions per flow sheet       []  Discharge due to:_  []  Other:_      Celeste Saleem PTA 2/22/2018  9:20 AM    Future Appointments  Date Time Provider Meeta Wade   2/27/2018 9:30 AM Celeste Saleem PTA [facility]   3/1/2018 1:00 PM Celeste Saleem PTA [facility]   3/6/2018 9:30 AM Celeste Saleem PTA [facility]   3/19/2018 9:30 AM Concepcion Ibanez  [address]

## 2018-02-27 ENCOUNTER — HOSPITAL ENCOUNTER (OUTPATIENT)
Dept: PHYSICAL THERAPY | Age: 68
Discharge: HOME | End: 2018-02-27
Payer: MEDICARE

## 2018-02-27 PROCEDURE — 97140 MANUAL THERAPY 1/> REGIONS: CPT

## 2018-02-27 PROCEDURE — 97110 THERAPEUTIC EXERCISES: CPT

## 2018-02-27 NOTE — PROGRESS NOTES
PT DAILY TREATMENT NOTE - [facility]     Patient Name: Nikki Jeans  Date:2018  : 1950  [x]  Patient  Verified  Payor: Jacinta Serranos / Plan: [address] [place] [address] HMO / Product Type: Managed Care Medicare /    In time:9:37  Out time:10:06  Total Treatment Time (min): 29  Total Timed Codes (min): 29  1:1 Treatment Time ([address] only): 25   Visit #: 6 of 8    Treatment Area: Radiculopathy, cervical region [M54.12]    SUBJECTIVE  Pain Level (0-10 scale): 0/10  Any medication changes, allergies to medications, adverse drug reactions, diagnosis change, or new procedure performed?: [x] No    [] Yes (see summary sheet for update)  Subjective functional status/changes:   [] No changes reported  Pt reports no new complaints of pain. OBJECTIVE    11 min Therapeutic Exercise:  [x] See flow sheet :   Rationale: increase ROM and increase strength to improve the patients ability to perform functional activities. 10 min Neuromuscular Re-education:  [x]  See flow sheet :   Rationale: increase strength, improve coordination and increase proprioception  to improve the patients ability to tolerate ADLs. 8 min Manual Therapy:  SOR, MFR to BRIANA calzada. Rationale: decrease pain, increase ROM and increase tissue extensibility to improve tolerance to ADLs. With   [] TE   [] TA   [] neuro   [] other: Patient Education: [x] Review HEP    [] Progressed/Changed HEP based on:   [] positioning   [] body mechanics   [] transfers   [] heat/ice application    [] other:      Other Objective/Functional Measures: FOTO: 66     Pain Level (0-10 scale) post treatment: 0/10    ASSESSMENT/Changes in Function: Pt demonstrates good form with all exercises after initial vc's.      Patient will continue to benefit from skilled PT services to modify and progress therapeutic interventions, address functional mobility deficits, address ROM deficits, address strength deficits, analyze and address soft tissue restrictions, analyze and cue movement patterns, analyze and modify body mechanics/ergonomics and assess and modify postural abnormalities to attain remaining goals. []  See Plan of Care  []  See progress note/recertification  []  See Discharge Summary         Progress towards goals / Updated goals:  Short Term Goals: To be accomplished in two weeks:                        7.  Pt will report <50% of the time L UE radiculopathy when driving or on the computer to improve quality of life.                        2.  Pt will demonstrate L cervical SB to 15 degrees or more to improve ADLs. Long Term Goals: To be accomplished in [address]:                        0.  Pt will demonstrate FOTO to 70 or greater to indicate improved functional task completion. -not met score 66; same as intake. 2/27/18                        2.  Pt will demonstrate B cervical SB to 20 degrees or more to improve ADLs.                         3.  Pt will report ability to drive without instances of L UE radiculopathy to improve quality of life.       PLAN  []  Upgrade activities as tolerated     [x]  Continue plan of care  []  Update interventions per flow sheet       []  Discharge due to:_  []  Other:_      Mulu Dotson PTA 2/27/2018  10:08 AM    Future Appointments  Date Time Provider Meeta Wade   3/1/2018 1:00 PM Mulu Dotson PTA [facility]   [facility]   3/19/2018 9:30 AM Darell Trejo  [address]

## 2018-03-06 ENCOUNTER — HOSPITAL ENCOUNTER (OUTPATIENT)
Dept: PHYSICAL THERAPY | Age: 68
Discharge: HOME | End: 2018-03-06
Payer: MEDICARE

## 2018-03-06 PROCEDURE — 97140 MANUAL THERAPY 1/> REGIONS: CPT

## 2018-03-06 PROCEDURE — 97110 THERAPEUTIC EXERCISES: CPT

## 2018-03-06 NOTE — PROGRESS NOTES
PT DAILY TREATMENT NOTE - [facility]     Patient Name: Shasta Butler  Date:3/6/2018  : 1950  [x]  Patient  Verified  Payor: Skye Fermin / Plan: [address] [place] [address] HMO / Product Type: Managed Care Medicare /    In time:9:30  Out time:10:00  Total Treatment Time (min): 30  Total Timed Codes (min): 30  1:1 Treatment Time ( [address] only): 30   Visit #: 7 of 8    Treatment Area: Radiculopathy, cervical region [M54.12]    SUBJECTIVE  Pain Level (0-10 scale): 1/10  Any medication changes, allergies to medications, adverse drug reactions, diagnosis change, or new procedure performed?: [x] No    [] Yes (see summary sheet for update)  Subjective functional status/changes:   [] No changes reported  Pt reports minimal c/o pain in neck. OBJECTIVE    22 min Therapeutic Exercise:  [x] See flow sheet :   Rationale: increase ROM and increase strength to improve the patients ability to perform functional activities. 8 min Manual Therapy:  SOR, PROM c/s   Rationale: decrease pain, increase ROM and increase tissue extensibility to improve tolerance to ADLs. With   [] TE   [] TA   [] neuro   [] other: Patient Education: [x] Review HEP    [] Progressed/Changed HEP based on:   [] positioning   [] body mechanics   [] transfers   [] heat/ice application    [] other:      Other Objective/Functional Measures: no cervical limitations noted in all planes. Pain Level (0-10 scale) post treatment: 0/10    ASSESSMENT/Changes in Function: Pt demonstrates improved cervical AROM/PROM in all planes with no c/o pain. Patient will continue to benefit from skilled PT services to modify and progress therapeutic interventions, address functional mobility deficits, address ROM deficits, address strength deficits, analyze and address soft tissue restrictions, analyze and cue movement patterns, analyze and modify body mechanics/ergonomics and assess and modify postural abnormalities to attain remaining goals.      []  See Plan of Care  []  See progress note/recertification  []  See Discharge Summary         Progress towards goals / Updated goals:  Short Term Goals: To be accomplished in two weeks:                        1.  Pt will report <50% of the time L UE radiculopathy when driving or on the computer to improve quality of life.                        2.  Pt will demonstrate L cervical SB to 15 degrees or more to improve ADLs. Long Term Goals: To be accomplished in [address]:                        0.  Pt will demonstrate FOTO to 70 or greater to indicate improved functional task completion. -not met score 66; same as intake. 2/27/18                        2.  Pt will demonstrate B cervical SB to 20 degrees or more to improve ADLs.                         7.  Pt will report ability to drive without instances of L UE radiculopathy to improve quality of life.       PLAN  []  Upgrade activities as tolerated     [x]  Continue plan of care  []  Update interventions per flow sheet       []  Discharge due to:_  []  Other:_      Robert Powers PTA 3/6/2018  9:49 AM    Future Appointments  Date Time Provider Meeta Wade   3/19/2018 9:30 AM Moise Scott  [address]

## 2018-03-09 ENCOUNTER — HOSPITAL ENCOUNTER (OUTPATIENT)
Dept: PHYSICAL THERAPY | Age: 68
Discharge: HOME | End: 2018-03-09
Payer: MEDICARE

## 2018-03-09 PROCEDURE — 97112 NEUROMUSCULAR REEDUCATION: CPT

## 2018-03-09 PROCEDURE — 97110 THERAPEUTIC EXERCISES: CPT

## 2018-03-09 PROCEDURE — G8986 CARRY D/C STATUS: HCPCS

## 2018-03-09 PROCEDURE — G8985 CARRY GOAL STATUS: HCPCS

## 2018-03-09 NOTE — PROGRESS NOTES
PT DAILY TREATMENT NOTE - [facility]     Patient Name: Kay Peacock  Date:3/9/2018  : 1950  [x]  Patient  Verified  Payor: Edwarmoiz Chester / Plan: [address] [place] [address] HMO / Product Type: Managed Care Medicare /    In time:11:00  Out time:11:30  Total Treatment Time (min): 30  Total Timed Codes (min): 30  1:1 Treatment Time ( [address] only): 30   Visit #: 8 of 8    Treatment Area: Radiculopathy, cervical region [M54.12]    SUBJECTIVE  Pain Level (0-10 scale): 0/10  Any medication changes, allergies to medications, adverse drug reactions, diagnosis change, or new procedure performed?: [x] No    [] Yes (see summary sheet for update)  Subjective functional status/changes:   [] No changes reported  Pt denies any c/o pain. OBJECTIVE    20 min Therapeutic Exercise:  [x] See flow sheet :   Rationale: increase ROM and increase strength to improve the patients ability to perform functional activities. 10 min Neuromuscular Re-education:  [x]  See flow sheet :   Rationale: increase ROM and increase strength  to improve the patients ability to perform functional activities. With   [] TE   [] TA   [] neuro   [] other: Patient Education: [x] Review HEP    [] Progressed/Changed HEP based on:   [] positioning   [] body mechanics   [] transfers   [] heat/ice application    [] other:      Other Objective/Functional Measures: C/S AROM sidebend L 30; R 35 degrees. Pain Level (0-10 scale) post treatment: 0/10    ASSESSMENT/Changes in Function: Pt demonstrates improved cervical mobility and decreased radicular symptoms. Pt continues to have paraesthesias in L index finger with no other symptoms reported. Pt will be discharged at this time due to progress toward functional goals.      Patient will continue to benefit from skilled PT services to modify and progress therapeutic interventions, address functional mobility deficits, address ROM deficits, address strength deficits, analyze and address soft tissue restrictions, analyze and cue movement patterns, analyze and modify body mechanics/ergonomics and assess and modify postural abnormalities to attain remaining goals. []  See Plan of Care  []  See progress note/recertification  [x]  See Discharge Summary         Progress towards goals / Updated goals:  Short Term Goals: To be accomplished in two weeks:                        2.  Pt will report <50% of the time L UE radiculopathy when driving or on the computer to improve quality of life. - met per patient report. 3/9/18                        2.  Pt will demonstrate L cervical SB to 15 degrees or more to improve ADLs. Long Term Goals: To be accomplished in [address]:                        2.  Pt will demonstrate FOTO to 70 or greater to indicate improved functional task completion. -not met score 66; same as intake. 2/27/18                        2.  Pt will demonstrate B cervical SB to 20 degrees or more to improve ADLs. - met. L 30 degrees; R 35 degrees. 3/9/18                        3.  Pt will report ability to drive without instances of L UE radiculopathy to improve quality of life. - Met per patient report.  3/9/18        PLAN  []  Upgrade activities as tolerated     []  Continue plan of care  []  Update interventions per flow sheet       [x]  Discharge due to:_  []  Other:_      Tiffanie Munroe PTA 3/9/2018  11:06 AM    Future Appointments  Date Time Provider Meeta Wade   3/19/2018 9:30 AM Katheryn Ventura  [address]

## 2018-03-12 NOTE — PROGRESS NOTES
[facility]  [address]  [place], [address]  [phone] fax    Physical Therapy Discharge Summary    Patient name: Tino Ritchie Start of Care: 2018   Referral source: Gia Sen MD : 1950                         Medical Diagnosis: Radiculopathy, cervical region [M54.12] Onset Date:                         Treatment Diagnosis: Cervical radiculopathy   Prior Hospitalization: see medical history Provider#: [id number]   Medications: Verified on Patient summary List    Comorbidities: Hearing impaired, lumbar discectomy, HBP, R hip injection   Prior Level of Function: L hand dominant, yard work and  duties    Visits from [place] of Care: 8    Missed Visits: 0  Reporting Period : 2018 to 2018    Summary of Care:  Short Term Goals: To be accomplished in two weeks:                        1.  Pt will report <50% of the time L UE radiculopathy when driving or on the computer to improve quality of life. - met per patient report. 3/9/18                        2.  Pt will demonstrate L cervical SB to 15 degrees or more to improve ADLs. Long Term Goals: To be accomplished in [address]:                        1.  Pt will demonstrate FOTO to 70 or greater to indicate improved functional task completion. -not met score 66; same as intake. 18                        2.  Pt will demonstrate B cervical SB to 20 degrees or more to improve ADLs. - met. L 30 degrees; R 35 degrees. 3/9/18                        3.  Pt will report ability to drive without instances of L UE radiculopathy to improve quality of life. - Met per patient report. 3/9/18    G-Codes (GP)  Carry    Goal  CJ= 20-39%   D/C  CJ= 20-39%    The severity rating is based on clinical judgment and the FOTO score. ASSESSMENT/RECOMMENDATIONS: Pt demonstrates improved cervical mobility and decreased radicular symptoms.   Pt continues to have paraesthesias in L index finger with no other symptoms reported. Pt will be discharged at this time due to progress toward functional goals.    [x]Discontinue therapy: [x]Patient has reached or is progressing toward set goals      []Patient is non-compliant or has abdicated      []Due to lack of appreciable progress towards set goals    Randy Abel PT 3/12/2018 10:43 AM

## 2018-03-19 ENCOUNTER — OFFICE VISIT (OUTPATIENT)
Dept: ORTHOPEDIC SURGERY | Age: 68
End: 2018-03-19

## 2018-03-19 NOTE — PROGRESS NOTES
Patient had to reschedule due to provider running behind. He was unable to stay due to other obligations, he was given an apology and escorted to the front lobby area.

## 2018-03-26 ENCOUNTER — OFFICE VISIT (OUTPATIENT)
Dept: ORTHOPEDIC SURGERY | Age: 68
End: 2018-03-26

## 2018-03-26 VITALS
SYSTOLIC BLOOD PRESSURE: 148 MMHG | OXYGEN SATURATION: 99 % | BODY MASS INDEX: 30.01 KG/M2 | RESPIRATION RATE: 18 BRPM | HEIGHT: 68 IN | WEIGHT: 198 LBS | TEMPERATURE: 98.6 F | HEART RATE: 72 BPM | DIASTOLIC BLOOD PRESSURE: 82 MMHG

## 2018-03-26 DIAGNOSIS — M54.12 CERVICAL RADICULOPATHY: Primary | ICD-10-CM

## 2018-03-26 NOTE — MR AVS SNAPSHOT
[address] [place], [place] [address] [address] [place] [address] 
[phone] Patient: Tino Ritchie MRN: [id number] AOD:5/68/7863 Visit Information Date & Time Provider Department Dept. Phone Encounter #  
 3/26/2018  3:45 PM Valentino Roger, MD [facility] [phone] [id number] Follow-up Instructions Return if symptoms worsen or fail to improve. Upcoming Health Maintenance Date Due Hepatitis C Screening 1950 DTaP/Tdap/Td series (1 - Tdap) 2/10/1970 FOBT Q 1 YEAR AGE 50-75 2/10/1999 ZOSTER VACCINE AGE 60> 12/10/2008 GLAUCOMA SCREENING Q2Y 2/10/2014 Pneumococcal 65+ Low/Medium Risk (1 of 2 - PCV13) 2/10/2014 Influenza Age 5 to Adult 8/1/2017 MEDICARE YEARLY EXAM 3/14/2018 Allergies as of 3/26/2018  Review Complete On: 3/19/2018 By: Jenny Chandler No Known Allergies Current Immunizations  Never Reviewed No immunizations on file. Not reviewed this visit You Were Diagnosed With   
  
 Codes Comments Cervical radiculopathy    -  Primary ICD-10-CM: M54.12 
ICD-9-CM: 723.4 Vitals BP Pulse Temp Resp Height(growth percentile) Weight(growth percentile) 148/82 72 98.6 °F (37 °C) 18 5' 8\" (1.727 m) 198 lb (89.8 kg) SpO2 BMI Smoking Status 99% 30.11 kg/m2 Former Smoker BMI and BSA Data Body Mass Index Body Surface Area  
 30.11 kg/m 2 2.08 m 2 Preferred Pharmacy Pharmacy Name Phone [address], Divya Early [address] [facility] 22 [address] [facility] [phone] Your Updated Medication List  
  
   
This list is accurate as of 3/26/18  4:57 PM.  Always use your most recent med list.  
  
  
  
  
 allopurinol 100 mg tablet Commonly known as:  Rhodia Bannister Take  by mouth daily. aspirin 81 mg chewable tablet Take 81 mg by mouth daily. colchicine 0.6 mg tablet Take 0.6 mg by mouth as needed. gabapentin 300 mg capsule Commonly known as:  NEURONTIN Take 1 Cap by mouth three (3) times daily. losartan 100 mg tablet Commonly known as:  COZAAR TK 1 T PO QD  
  
 simvastatin 40 mg tablet Commonly known as:  ZOCOR Take  by mouth nightly. tamsulosin 0.4 mg capsule Commonly known as:  FLOMAX TK 1 C PO D BEFORE SUPPER Follow-up Instructions Return if symptoms worsen or fail to improve. Introducing [facility]! Stepan Cosby introduces [employer] patient portal. Now you can access parts of your medical record, email your doctor's office, and request medication refills online. 1. In your internet browser, go to [email or web address] + Sail. [employer]/Porter [employer] Sail 2. Click on the First Time User? Click Here link in the Sign In box. You will see the New Member Sign Up page. 3. Enter your [employer] Access Code exactly as it appears below. You will not need to use this code after youve completed the sign-up process. If you do not sign up before the expiration date, you must request a new code. · [employer] Access Code: [email or web address] Expires: 5/6/2018 11:07 AM 
 
4. Enter the last four digits of your Social Security Number (xxxx) and Date of Birth (mm/dd/yyyy) as indicated and click Submit. You will be taken to the next sign-up page. 5. Create a [facility]ura ID. This will be your [employer]ura login ID and cannot be changed, so think of one that is secure and easy to remember. 6. Create a [employer] password. You can change your password at any time. 7. Enter your Password Reset Question and Answer. This can be used at a later time if you forget your password. 8. Enter your e-mail address. You will receive e-mail notification when new information is available in [address] 9. Click Sign Up. You can now view and download portions of your medical record.  
10. Click the Download Summary menu link to download a portable copy of your medical information. If you have questions, please visit the Frequently Asked Questions section of the [employer] website. Remember, [employer] is NOT to be used for urgent needs. For medical emergencies, dial 911. Now available from your iPhone and Android! Please provide this summary of care documentation to your next provider. Your primary care clinician is listed as Angelica Stewart. If you have any questions after today's visit, please call [phone].

## 2018-03-26 NOTE — PROGRESS NOTES
[address], [address]  [facility], [address]  Phone: [phone]  Fax: [phone]        Johnie Christine  : 1950  PCP: Farrukh Dasilva MD  3/26/2018    PROGRESS NOTE      ASSESSMENT AND PLAN    Filemon Perla comes in to the office today for PT f/u. He still experiences paraesthesia in his left thumb and index finger and LUE along a C6/7 distribution. He has seen some improvement in his neck pain and paraesthesia in his LUE. However, he notices the paraesthesia is worse when he is at his computer, and he does not experience it when he is sitting at the table on his ipad. He rates his pain as a 0/10 today. We discussed cervical injections, but he defers at this time. We discussed continuing to monitor his symptoms and using Gabapentin (300mg BID) vs EMG testing. I offered to increase his medication to 600mg BID, but he deferred. At this time, he would prefer his current medication regimen and monitor his symptoms. I advised him to be careful and monitor for worsening radicular symptoms. He notes he has seen some improvement of his low back pain but continues to have pain when he stands for prolonged periods of time. He has been maintaining an HEP using his exercises from PT. Pt will f/u PRN. Diagnoses and all orders for this visit:    1. Cervical radiculopathy       Follow-up Disposition: Not on File      HISTORY OF PRESENT ILLNESS  Filemon Perla is a 70 y.o. male. A&P / HPI from 2017:  Shaquille Ramos comes in to the office today c/o low back pain with sharp radicular symptoms into the right lower extremity.       The lumbar MRI showed foraminal stenosis at the bilateral L5 and S1 nerve roots. On the examination, he did not have neurological deficits. His symptoms are right sided.      I referred him to get a right L5 and S1 SNRB.  I increased his Gabapentin to 600 mg TID.      HISTORY OF PRESENT ILLNESS  Shaquille Mann Richard is a 75 y. o. male c/o low back pain with sharp radicular symptoms into the right lower extremity that has been gradually worsening over the last year. He reports intermittent numbness and tingling in the lower extremity. He notes a recent ED visit approximately 2 months ago due to severe pain located at the right calf which cause ambulatory issues. He is currently on Gabapentin 600 mg BID, which provided mild relief. From a functional standpoint, he is unable to stand for a prolonged period of time. He notes constantly changing positions to relieve his pain. He is a previous patient of Dr. Rosibel Ragsdale who preformed a laminectomy/discectomy at the left L4-5 level.       Pt denies any fevers, chills, nausea, vomiting. Pt denies any chest pain and shortness of breath. Pt denies any ear, nose, and throat problems. Pt denies any fecal or urinary incontinence.       He is retired.       Updates from 11/08/17:  Pt presents for a right L5 and S1 SNRB f/u. The procedure provided continue long term relief. His pain today is rated at an 0/10.      Updates from 02/05/18:  Pt presents for pain radiating into his left arm in a C6 distribution. His pain has been intermittent for over a year, worse and more frequent in the past several weeks. He reports his pain worsened when he decreased his gabapentin to BID. Updates from 03/26/18:  Pt presents for PT f/u. He still experiences paraesthesia in his left thumb and index finger. He has seen some improvement in his neck pain and paraesthesia in his LUE. However, he notices the paraesthesia is worse when he is sitting at his computer, and he does not experience it when he is sitting at the table on his ipad. He notes he has seen some improvement of his low back pain but continues to have pain when he stands for prolonged periods of time.       PAST MEDICAL HISTORY   Past Medical History:   Diagnosis Date    Gout     High cholesterol     Hypertension        Past Surgical History:   Procedure Laterality Date    HX GI      hemorroidectomy    HX OTHER SURGICAL      back surgery bulged disc   . MEDICATIONS    Current Outpatient Prescriptions   Medication Sig Dispense Refill    gabapentin (NEURONTIN) 300 mg capsule Take 1 Cap by mouth three (3) times daily. 90 Cap 2    aspirin 81 mg chewable tablet Take 81 mg by mouth daily.  losartan (COZAAR) 100 mg tablet TK 1 T PO QD  5    tamsulosin (FLOMAX) 0.4 mg capsule TK 1 C PO D BEFORE SUPPER  3    simvastatin (ZOCOR) 40 mg tablet Take  by mouth nightly.  allopurinol (ZYLOPRIM) 100 mg tablet Take  by mouth daily.  colchicine 0.6 mg tablet Take 0.6 mg by mouth as needed. ALLERGIES  No Known Allergies       SOCIAL HISTORY    Social History     Social History    Marital status:      Spouse name: N/A    Number of children: N/A    Years of education: N/A     Social History Main Topics    Smoking status: Former Smoker     Quit date: 1/1/1987    Smokeless tobacco: Never Used    Alcohol use No    Drug use: Not on file    Sexual activity: Not on file     Other Topics Concern    Not on file     Social History Narrative       FAMILY HISTORY  No family history on file. REVIEW OF SYSTEMS  Review of Systems   Musculoskeletal: Positive for neck pain. LUE paraesthesia           PHYSICAL EXAMINATION  Visit Vitals    /82    Pulse 72    Temp 98.6 °F (37 °C)    Resp 18    Ht 5' 8\" (1.727 m)    Wt 198 lb (89.8 kg)    SpO2 99%    BMI 30.11 kg/m2       Pain Assessment  11/8/2017   Location of Pain Back   Location Modifiers -   Severity of Pain 0   Duration of Pain -   Frequency of Pain Intermittent   Result of Injury No           Constitutional:  Well developed, well nourished, in no acute distress. Psychiatric: Affect and mood are appropriate. Integumentary: No rashes or abrasions noted on exposed areas. SPINE/MUSCULOSKELETAL EXAM    Cervical spine:  Neck is midline.    Normal muscle tone. No focal atrophy is noted. ROM pain free. Shoulder ROM intact. No tenderness to palpation. Positive Left Spurling's sign. Negative Tinel's sign. Negative Howell's sign.       Sensation in the bilateral arms grossly intact to light touch.          Lumbar spine:  No rash, ecchymosis, or gross obliquity. No fasciculations. No focal atrophy is noted. No pain with hip ROM. Full range of motion. No tenderness to palpation. No tenderness to palpation at the sciatic notch. SI joints non-tender. Trochanters non tender.      Sensation in the bilateral legs grossly intact to light touch. MOTOR:      Biceps  Triceps Deltoids Wrist Ext Wrist Flex Hand Intrin   Right 5/5 5/5 5/5 5/5 5/5 5/5   Left 5/5 5/5 5/5 5/5 5/5 5/5             Hip Flex  Quads Hamstrings Ankle DF EHL Ankle PF   Right 5/5 5/5 5/5 5/5 5/5 5/5   Left 5/5 5/5 5/5 5/5 5/5 5/5     Cervical Spine MRI from 1/18/18 reviewed with patient in office today:  1. Multilevel disc disease producing various degree of canal stenosis, overall relatively mild. No moderate or high-grade stenosis. 2. Moderate to marked foraminal stenosis at multiple levels. Particular concern for nerve root impingement at C3, C5, C6, C7 on the R, and C7 on the L.   3. High T2 signal cleft through the anterior C5-6 disc suggesting a tear of the disc as may be seen with a hyperextension injury, although there are no other stigmata of such. Lumbar MRI images taken on 08/25/2017 personally reviewed with patient:  Impression:  1. Moderate circumferential disc bulge and endplate spondylosis at L4-L5 with impingement of left L5 and abutment of descending right L5 nerve roots. Severe right and left foraminal stenosis, each with impingement of the respective L4 foraminal nerve roots. 2. Moderate multifactorial canal stenosis at L5-S1 without descending S1 nerve root impingement.  Severe right and left L5-S1 foraminal stenosis with impingement of left and right L5 foraminal nerve roots. 19 minutes of face-to-face contact were spent with the patient during today's visit extensively discussing symptoms and treatment plan. All questions were answered. More than half of this visit today was spent on counseling.      Written by Kusum Carl as dictated by Alberta Solis MD

## 2018-08-28 RX ORDER — GABAPENTIN 300 MG/1
CAPSULE ORAL
Qty: 90 CAP | Refills: 0 | Status: SHIPPED | OUTPATIENT
Start: 2018-08-28

## 2018-10-18 ENCOUNTER — OFFICE VISIT (OUTPATIENT)
Dept: ORTHOPEDIC SURGERY | Facility: CLINIC | Age: 68
End: 2018-10-18

## 2018-10-18 VITALS
DIASTOLIC BLOOD PRESSURE: 99 MMHG | OXYGEN SATURATION: 96 % | HEIGHT: 68 IN | WEIGHT: 192 LBS | SYSTOLIC BLOOD PRESSURE: 182 MMHG | RESPIRATION RATE: 16 BRPM | TEMPERATURE: 96.5 F | BODY MASS INDEX: 29.1 KG/M2 | HEART RATE: 72 BPM

## 2018-10-18 DIAGNOSIS — M19.049 HAND ARTHRITIS: ICD-10-CM

## 2018-10-18 DIAGNOSIS — R20.2 NUMBNESS AND TINGLING IN BOTH HANDS: ICD-10-CM

## 2018-10-18 DIAGNOSIS — M54.16 LUMBAR RADICULOPATHY: ICD-10-CM

## 2018-10-18 DIAGNOSIS — M54.12 CERVICAL RADICULOPATHY: Primary | ICD-10-CM

## 2018-10-18 DIAGNOSIS — M54.50 LUMBAR SPINE PAIN: ICD-10-CM

## 2018-10-18 DIAGNOSIS — R20.0 NUMBNESS AND TINGLING IN BOTH HANDS: ICD-10-CM

## 2018-10-18 DIAGNOSIS — M65.341 TRIGGER FINGER, RIGHT RING FINGER: ICD-10-CM

## 2018-10-18 NOTE — PATIENT INSTRUCTIONS
Trigger Finger: Care Instructions Your Care Instructions A trigger finger is a finger stuck in a bent position. The bent finger usually straightens out on its own. A trigger finger can be painful, but it normally is not a serious problem. Trigger fingers seem to occur more in some groups of people. These include people who have diabetes or arthritis or who have injured their hands in the past. This problem also occurs in musicians and people who  tools often. Rest, exercises, and other things you can do at home may help your trigger finger relax so that it can bend as it should. You may get a corticosteroid shot. This can reduce swelling and pain. Your doctor may put a splint on your finger. This will give your finger some rest and avoid irritating the joint. You may need surgery if the finger keeps locking in a bent position. Follow-up care is a key part of your treatment and safety. Be sure to make and go to all appointments, and call your doctor if you are having problems. It's also a good idea to know your test results and keep a list of the medicines you take. How can you care for yourself at home? · If your doctor put a splint on your finger, wear the splint as directed. Do not remove it until your doctor says you can. · You may need to change your activities to avoid movements that irritate the finger. · If your finger is swollen, put ice or a cold pack on your finger for 10 to 20 minutes at a time. Try to do this every 1 to 2 hours for the next 3 days (when you are awake) or until the swelling goes down. Put a thin cloth between the ice and your skin. · Prop up your hand on a pillow when you ice it or anytime you sit or lie down during the next 3 days. Try to keep it above the level of your heart. This will help reduce swelling. · Take your medicines exactly as prescribed. Call your doctor if you think you are having a problem with your medicine. · Ask your doctor if you can take an over-the-counter pain medicine, such as acetaminophen (Tylenol), ibuprofen (Advil, Motrin), or naproxen (Aleve). Be safe with medicines. Read and follow all instructions on the label. · If your doctor recommends exercises, do them as directed. When should you call for help? Call your doctor now or seek immediate medical care if: 
  · Your finger locks in a bent position and will not straighten.  
 Watch closely for changes in your health, and be sure to contact your doctor if: 
  · You do not get better as expected. Where can you learn more? Go to http://rosey-chris.info/. Enter M826 in the search box to learn more about \"Trigger Finger: Care Instructions. \" Current as of: November 29, 2017 Content Version: 11.8 © 9715-1441 [employer]. Care instructions adapted under license by [employer] (which disclaims liability or warranty for this information). If you have questions about a medical condition or this instruction, always ask your healthcare professional. [address] any warranty or liability for your use of this information. Carpal Tunnel Syndrome: Care Instructions Your Care Instructions Carpal tunnel syndrome is a nerve problem. It can cause tingling, numbness, weakness, or pain in the fingers, thumb, and hand. The median nerve and several tough tissues called tendons run through a space in the wrist called the carpal tunnel. The repeated hand motions used in work and some hobbies and sports can put pressure on the nerve. Pregnancy and several conditions, including diabetes, arthritis, and an underactive thyroid, also can cause carpal tunnel syndrome. You may be able to limit an activity or do it differently to reduce your symptoms. You also can take other steps to feel better. If your symptoms are mild, 1 to 2 weeks of home treatment are likely to ease your pain. Surgery is needed only if other treatments do not work. Follow-up care is a key part of your treatment and safety. Be sure to make and go to all appointments, and call your doctor if you are having problems. It's also a good idea to know your test results and keep a list of the medicines you take. How can you care for yourself at home? · If possible, stop or reduce the activity that causes your symptoms. If you cannot stop the activity, take frequent breaks to rest and stretch or change hand positions to do a task. Try switching hands, such as when using a computer mouse. · Try to avoid bending or twisting your wrists. · Ask your doctor if you can take an over-the-counter pain medicine, such as acetaminophen (Tylenol), ibuprofen (Advil, Motrin), or naproxen (Aleve). Be safe with medicines. Read and follow all instructions on the label. · If your doctor prescribes corticosteroid medicine to help reduce pain and swelling, take it exactly as prescribed. Call your doctor if you think you are having a problem with your medicine. · Put ice or a cold pack on your wrist for 10 to 20 minutes at a time to ease pain. Put a thin cloth between the ice and your skin. · If your doctor or your physical or occupational therapist tells you to wear a wrist splint, wear it as directed to keep your wrist in a neutral position. This also eases pressure on your median nerve. · Ask your doctor whether you should have physical or occupational therapy to learn how to do tasks differently. · Try a yoga class to stretch your muscles and build strength in your hands and wrists. Yoga has been shown to ease carpal tunnel symptoms. To prevent carpal tunnel · When working at a computer, keep your hands and wrists in line with your forearms. Hold your elbows close to your sides. Take a break every 10 to 15 minutes. · Try these exercises: 
? Warm up: Rotate your wrist up, down, and from side to side.  Repeat this 4 times. Stretch your fingers far apart, relax them, then stretch them again. Repeat 4 times. Stretch your thumb by pulling it back gently, holding it, and then releasing it. Repeat 4 times. ? Prayer stretch: Start with your palms together in front of your chest just below your chin. Slowly lower your hands toward your waistline while keeping your hands close to your stomach and your palms together until you feel a mild to moderate stretch under your forearms. Hold for 10 to 20 seconds. Repeat 4 times. ? Wrist flexor stretch: Hold your arm in front of you with your palm up. Bend your wrist, pointing your hand toward the floor. With your other hand, gently bend your wrist further until you feel a mild to moderate stretch in your forearm. Hold for 10 to 20 seconds. Repeat 4 times. ? Wrist extensor stretch: Repeat the steps for the wrist flexor stretch, but begin with your extended hand palm down. · Squeeze a rubber exercise ball several times a day to keep your hands and fingers strong. · Avoid holding objects (such as a book) in one position for a long time. When possible, use your whole hand to grasp an object. Using just the thumb and index finger can put stress on the wrist. 
· Do not smoke. It can make this condition worse by reducing blood flow to the median nerve. If you need help quitting, talk to your doctor about stop-smoking programs and medicines. These can increase your chances of quitting for good. When should you call for help? Watch closely for changes in your health, and be sure to contact your doctor if: 
  · Your pain or other problems do not get better with home care.  
  · You want more information about physical or occupational therapy.  
  · You have side effects of your corticosteroid medicine, such as: 
? Weight gain. ? Mood changes. ? Trouble sleeping. ? Bruising easily.  
  · You have any other problems with your medicine. Where can you learn more? Go to http://rosey-crhis.info/. Enter R432 in the search box to learn more about \"Carpal Tunnel Syndrome: Care Instructions. \" Current as of: November 29, 2017 Content Version: 11.8 © 6813-2297 [employer]. Care instructions adapted under license by [employer] (which disclaims liability or warranty for this information). If you have questions about a medical condition or this instruction, always ask your healthcare professional. [address] any warranty or liability for your use of this information.

## 2018-11-05 ENCOUNTER — OFFICE VISIT (OUTPATIENT)
Dept: ORTHOPEDIC SURGERY | Facility: CLINIC | Age: 68
End: 2018-11-05

## 2018-11-05 VITALS
HEIGHT: 68 IN | WEIGHT: 197 LBS | BODY MASS INDEX: 29.86 KG/M2 | SYSTOLIC BLOOD PRESSURE: 175 MMHG | RESPIRATION RATE: 18 BRPM | HEART RATE: 62 BPM | DIASTOLIC BLOOD PRESSURE: 100 MMHG | OXYGEN SATURATION: 97 % | TEMPERATURE: 97.6 F

## 2018-11-05 DIAGNOSIS — M19.049 HAND ARTHRITIS: ICD-10-CM

## 2018-11-05 DIAGNOSIS — R20.2 NUMBNESS AND TINGLING IN BOTH HANDS: ICD-10-CM

## 2018-11-05 DIAGNOSIS — M54.12 CERVICAL RADICULOPATHY: Primary | ICD-10-CM

## 2018-11-05 DIAGNOSIS — R20.0 NUMBNESS AND TINGLING IN BOTH HANDS: ICD-10-CM

## 2018-11-05 DIAGNOSIS — M65.341 TRIGGER FINGER, RIGHT RING FINGER: ICD-10-CM

## 2018-11-05 RX ORDER — GLUCOSAMINE SULFATE 1500 MG
POWDER IN PACKET (EA) ORAL 2 TIMES DAILY
COMMUNITY

## 2018-11-05 NOTE — PROGRESS NOTES
Patient: Lorraine Alejandro                MRN: [id number]       SSN: [id number] YOB: 1950        AGE: 70 y.o. SEX: male PCP: David Sebastian MD 
11/05/18 Chief Complaint Patient presents with  
 Hand Pain Right HISTORY:  Lorraine Alejandro is a 70 y.o. male who is seen for catching and popping of his fingers bilaterally. He has been experiencing numbness and tingling especially at night and with extended periods without use. He reports pain, numbness and tingling in in his middle, and ring fingers bilaterally R>L. He reports that his finger triggering decreases with use throughout the day. He has been experiencing discomfort for the past few months that has increased recently. He reports mostly triggering of his right ring finger and mild triggering of his left middle and right index fingers. He was previously seen by Dr. Jewell Dickinson for lumbar and cervical radiculopathy. Pain Assessment  11/5/2018 Location of Pain Hand Location Modifiers Right Severity of Pain 0 Quality of Pain -  
Quality of Pain Comment - Duration of Pain - Frequency of Pain - Aggravating Factors - Aggravating Factors Comment - Limiting Behavior No  
Relieving Factors - Result of Injury - Occupation, etc:  Mr. Vic Hurst retired as a  at the [employer]. He lives in [place] with his wife. He has 3 daughters and 1 son. 2 of his daughters live in this area. He has 7 grandchildren and 3 fraternal triplets great grand kids--2 boys and 1 girl. He is going on a cruise tomorrow to the [place] leaving from [place]. Current weight is 192 pounds. He is 5'8\" tall. No results found for: HBA1C, HGBE8, DUM0CKCJ, ICC5QUVG, OZZ2YDGC Weight Metrics 11/5/2018 10/18/2018 3/26/2018 2/5/2018 11/8/2017 10/25/2017 9/25/2017 Weight 197 lb 192 lb 198 lb 198 lb 9.6 oz 192 lb 6.4 oz 194 lb 194 lb  
 BMI 29.95 kg/m2 29.19 kg/m2 30.11 kg/m2 30.2 kg/m2 29.25 kg/m2 29.5 kg/m2 29.5 kg/m2 Patient Active Problem List  
Diagnosis Code  Lumbar radiculopathy M54.16  
 
REVIEW OF SYSTEMS: All Below are Negative except: See HPI Constitutional: negative for fever, chills, and weight loss. Cardiovascular: negative for chest pain, claudication, leg swelling, SOB, BRIONES Gastrointestinal: Negative for pain, N/V/C/D, Blood in stool or urine, dysuria,  hematuria, incontinence, pelvic pain. Musculoskeletal: See HPI Neurological: Negative for dizziness and weakness. Negative for headaches, Visual changes, confusion, seizures Phychiatric/Behavioral: Negative for depression, memory loss, substance  abuse. Extremities: Negative for hair changes, rash, or skin lesion changes. Hematologic: Negative for bleeding problems, bruising, pallor or swollen lymph  nodes Peripheral Vascular: No calf pain, no circulation deficits. Social History Socioeconomic History  Marital status:  Spouse name: Not on file  Number of children: Not on file  Years of education: Not on file  Highest education level: Not on file Social Needs  Financial resource strain: Not on file  Food insecurity - worry: Not on file  Food insecurity - inability: Not on file  Transportation needs - medical: Not on file  Transportation needs - non-medical: Not on file Occupational History  Not on file Tobacco Use  Smoking status: Former Smoker Last attempt to quit: 1987 Years since quittin.8  Smokeless tobacco: Never Used Substance and Sexual Activity  Alcohol use: No  
 Drug use: No  
 Sexual activity: Not on file Other Topics Concern  Not on file Social History Narrative  Not on file No Known Allergies Current Outpatient Medications Medication Sig  cholecalciferol (VITAMIN D3) 1,000 unit cap Take  by mouth two (2) times a day.  aspirin 81 mg chewable tablet Take 81 mg by mouth daily.  losartan (COZAAR) 100 mg tablet TK 1 T PO QD  
 tamsulosin (FLOMAX) 0.4 mg capsule TK 1 C PO D BEFORE SUPPER  
 simvastatin (ZOCOR) 40 mg tablet Take  by mouth nightly.  allopurinol (ZYLOPRIM) 100 mg tablet Take  by mouth daily.  colchicine 0.6 mg tablet Take 0.6 mg by mouth as needed.  gabapentin (NEURONTIN) 300 mg capsule TAKE 1 CAPSULE BY MOUTH THREE TIMES DAILY No current facility-administered medications for this visit. PHYSICAL EXAMINATION: 
Visit Vitals BP (!) 175/100 Pulse 62 Temp 97.6 °F (36.4 °C) (Oral) Resp 18 Ht 5' 8\" (1.727 m) Wt 197 lb (89.4 kg) SpO2 97% BMI 29.95 kg/m² Appearance: Alert, well appearing and pleasant patient who is in no distress, oriented to person, place/time, and who follows commands. HEENT: [place] hears well, does not require hearing aids. His sclera of the eyes are non-icteric. He is breathing normally and no respiratory accessory muscle use is noted. No JVD present and Neck ROM within normal limits. Psychiatric: Affect and mood are appropriate. Oriented x3 Heart[de-identified]  S1, S2 without murmer, regular rhythm Lungs:  Breath sounds clear to auscultation Cardiovascular/Peripheral Vascular: Normal pulses to each foot. Integumentary: No rashes,  wounds, or abrasions. Warm and normal color. No drainage. Gait: normal 
Sensory Exam: Intact/Normal Sensation Lymphatic: No evidence of Lymphedema Vascular:      
Pulses: palpable Varicosities none Wounds/Abrasion: None Present Neuro: Negative, no tremors ORTHO EXAMINATION: 
Examination Right Hand Left Hand Skin Intact Intact Deformity - enlargement of PIP middle finger Swelling - - Tenderness + right ring finger A1 pulley - Finger flexion Full Full Finger extension Full Full Sensation Normal Normal  
Capillary refill Normal Normal  
Heberden's nodes + + Dupuytren's - -  
 Definite triggering of right ring finger Mild triggering of right middle and left middle fingers RADIOGRAPHS: 
XR LEFT INDEX FINGER 6/15/18 [facility] No acute osseous findings or radiopaque retained foreign debris. Degenerative changes at the DIP joint.  
-I have independently reviewed these images during this office visit. -Dr. Diana Butterfield IMPRESSION:  Three views - No fractures, mild joint space narrowing. IMPRESSION:   
  ICD-10-CM ICD-9-CM 1. Cervical radiculopathy M54.12 723.4 2. Trigger finger, right ring finger M65.341 727.03   
3. Hand arthritis M19.049 716.94   
4. Numbness and tingling in both hands R20.0 782.0   
 R20.2 PLAN:  He will be scheduled for right ring trigger finger release. Risks of surgery outlined and informed consent obtained. He will follow up for H&P.   
 
Scribed by Rylan Belcher ([address]) as dictated by Bobby Dyson MD

## 2018-11-05 NOTE — PROGRESS NOTES
Patient: Rodolfo Lanes                MRN: [id number]       SSN: [id number] YOB: 1950        AGE: 70 y.o. SEX: male PCP: Anusha Saunders MD 
10/18/18 Chief Complaint Patient presents with  
 Hand Pain B/L hand pain HISTORY:  Rodolfo Lanes is a 70 y.o. male who is seen for catching and popping of his fingers bilaterally. He has been experiencing numbness and tingling especially at night and with extended periods without use. He reports pain, numbness and tingling in in his middle, and ring fingers bilaterally. He reports that his finger triggering decreases with use throughout the day. He has been experiencing discomfort for the past few months that has increased recently. He was previously seen by Dr. Nataly Dorado for lumbar and cervical radiculopathy. Pain Assessment  10/18/2018 Location of Pain Hand;Finger Location Modifiers Left;Right Severity of Pain 8 Quality of Pain Other (Comment); Locking; [place]; Throbbing Quality of Pain Comment Numbness Duration of Pain A few minutes Frequency of Pain Several times daily Aggravating Factors Bending; Other (Comment) Aggravating Factors Comment Gripping items Limiting Behavior Yes Relieving Factors Nothing Result of Injury No  
 
Occupation, etc:  Mr. David Chadwick retired as a  at the [employer]. He lives in [place] with his wife. He has 3 daughters and 1 son. 2 of his daughters live in this area. He has 7 grandchildren and 3 fraternal triplets great grand kids--2 boys and 1 girl. He is going on a cruise tomorrow to the [place] leaving from [place]. Current weight is 192 pounds. He is 5'8\" tall. No results found for: HBA1C, HGBE8, KWQ3WIQF, KXF9GHNO, TBN8TLYE Weight Metrics 10/18/2018 3/26/2018 2/5/2018 11/8/2017 10/25/2017 9/25/2017 Weight 192 lb 198 lb 198 lb 9.6 oz 192 lb 6.4 oz 194 lb 194 lb  
 BMI 29.19 kg/m2 30.11 kg/m2 30.2 kg/m2 29.25 kg/m2 29.5 kg/m2 29.5 kg/m2 Patient Active Problem List  
Diagnosis Code  Lumbar radiculopathy M54.16  
 
REVIEW OF SYSTEMS: All Below are Negative except: See HPI Constitutional: negative for fever, chills, and weight loss. Cardiovascular: negative for chest pain, claudication, leg swelling, SOB, BRIONES Gastrointestinal: Negative for pain, N/V/C/D, Blood in stool or urine, dysuria,  hematuria, incontinence, pelvic pain. Musculoskeletal: See HPI Neurological: Negative for dizziness and weakness. Negative for headaches, Visual changes, confusion, seizures Phychiatric/Behavioral: Negative for depression, memory loss, substance  abuse. Extremities: Negative for hair changes, rash, or skin lesion changes. Hematologic: Negative for bleeding problems, bruising, pallor or swollen lymph  nodes Peripheral Vascular: No calf pain, no circulation deficits. Social History Socioeconomic History  Marital status:  Spouse name: Not on file  Number of children: Not on file  Years of education: Not on file  Highest education level: Not on file Social Needs  Financial resource strain: Not on file  Food insecurity - worry: Not on file  Food insecurity - inability: Not on file  Transportation needs - medical: Not on file  Transportation needs - non-medical: Not on file Occupational History  Not on file Tobacco Use  Smoking status: Former Smoker Last attempt to quit: 1987 Years since quittin.8  Smokeless tobacco: Never Used Substance and Sexual Activity  Alcohol use: No  
 Drug use: Not on file  Sexual activity: Not on file Other Topics Concern  Not on file Social History Narrative  Not on file No Known Allergies Current Outpatient Medications Medication Sig  
 gabapentin (NEURONTIN) 300 mg capsule TAKE 1 CAPSULE BY MOUTH THREE TIMES DAILY  aspirin 81 mg chewable tablet Take 81 mg by mouth daily.  losartan (COZAAR) 100 mg tablet TK 1 T PO QD  
 tamsulosin (FLOMAX) 0.4 mg capsule TK 1 C PO D BEFORE SUPPER  
 simvastatin (ZOCOR) 40 mg tablet Take  by mouth nightly.  allopurinol (ZYLOPRIM) 100 mg tablet Take  by mouth daily.  colchicine 0.6 mg tablet Take 0.6 mg by mouth as needed. No current facility-administered medications for this visit. PHYSICAL EXAMINATION: 
Visit Vitals BP (!) 182/99 (BP 1 Location: Right arm, BP Patient Position: Sitting) Pulse 72 Temp 96.5 °F (35.8 °C) (Oral) Resp 16 Ht 5' 8\" (1.727 m) Wt 192 lb (87.1 kg) SpO2 96% BMI 29.19 kg/m² ORTHO EXAMINATION: 
Examination Right Hand Left Hand Skin Intact Intact Deformity - enlargement of PIP middle finger Swelling - - Tenderness - - Finger flexion Full Full Finger extension Full Full Sensation Normal Normal  
Capillary refill Normal Normal  
Heberden's nodes + + Dupuytren's - - Definite triggering of right ring finger Mild triggering of right middle finger RADIOGRAPHS: 
XR LEFT INDEX FINGER 6/15/18 [facility] No acute osseous findings or radiopaque retained foreign debris. Degenerative changes at the DIP joint.  
-I have independently reviewed these images during this office visit. -Dr. Nadine Negron IMPRESSION:  Three views - No fractures, mild joint space narrowing. IMPRESSION:   
  ICD-10-CM ICD-9-CM 1. Cervical radiculopathy M54.12 723.4 2. Lumbar radiculopathy M54.16 724.4 3. Lumbar spine pain M54.5 724.2 4. Numbness and tingling in both hands R20.0 782. 0 EMG TWO EXTREMITIES UPPER  
 R20.2  NCV/LAT SENSORY PER NERVE UP/LT  
   NCV/LAT SENSORY PER NERVE UP/RT 5. Trigger finger, right ring finger M65.341 727.03   
6. Hand arthritis M19.049 716.94 PLAN:  We discussed possible need for trigger finger cortisone injection or trigger finger release at some time in the future if pain continues. ATIYA EMG/NCV ordered. He will follow up with the results of his EMG/NCV. Scribed by Amara Berry ([address]) as dictated by Geoff Chappell MD

## 2018-11-06 DIAGNOSIS — Z01.812 BLOOD TESTS PRIOR TO TREATMENT OR PROCEDURE: ICD-10-CM

## 2018-11-06 DIAGNOSIS — Z01.811 PRE-OPERATIVE RESPIRATORY EXAMINATION: ICD-10-CM

## 2018-11-06 DIAGNOSIS — M65.341 TRIGGER RING FINGER OF RIGHT HAND: Primary | ICD-10-CM

## 2018-11-06 DIAGNOSIS — Z01.810 PRE-OPERATIVE CARDIOVASCULAR EXAMINATION: ICD-10-CM

## 2018-11-09 ENCOUNTER — HOSPITAL ENCOUNTER (OUTPATIENT)
Dept: GENERAL RADIOLOGY | Age: 68
Discharge: HOME | End: 2018-11-09
Payer: MEDICARE

## 2018-11-09 ENCOUNTER — HOSPITAL ENCOUNTER (OUTPATIENT)
Dept: PREADMISSION TESTING | Age: 68
Discharge: HOME | End: 2018-11-09
Payer: MEDICARE

## 2018-11-09 DIAGNOSIS — M65.341 TRIGGER RING FINGER OF RIGHT HAND: ICD-10-CM

## 2018-11-09 DIAGNOSIS — Z01.811 PRE-OPERATIVE RESPIRATORY EXAMINATION: ICD-10-CM

## 2018-11-09 DIAGNOSIS — Z01.812 BLOOD TESTS PRIOR TO TREATMENT OR PROCEDURE: ICD-10-CM

## 2018-11-09 DIAGNOSIS — Z01.810 PRE-OPERATIVE CARDIOVASCULAR EXAMINATION: ICD-10-CM

## 2018-11-09 LAB
ANION GAP SERPL CALC-SCNC: 7 MMOL/L (ref 3–18)
BUN SERPL-MCNC: 14 MG/DL (ref 7–18)
BUN/CREAT SERPL: 13 (ref 12–20)
CALCIUM SERPL-MCNC: 9.1 MG/DL (ref 8.5–10.1)
CHLORIDE SERPL-SCNC: 106 MMOL/L (ref 100–108)
CO2 SERPL-SCNC: 28 MMOL/L (ref 21–32)
CREAT SERPL-MCNC: 1.09 MG/DL (ref 0.6–1.3)
GLUCOSE SERPL-MCNC: 87 MG/DL (ref 74–99)
POTASSIUM SERPL-SCNC: 4.1 MMOL/L (ref 3.5–5.5)
SODIUM SERPL-SCNC: 141 MMOL/L (ref 136–145)

## 2018-11-09 PROCEDURE — 36415 COLL VENOUS BLD VENIPUNCTURE: CPT

## 2018-11-09 PROCEDURE — 93005 ELECTROCARDIOGRAM TRACING: CPT

## 2018-11-09 PROCEDURE — 80048 BASIC METABOLIC PNL TOTAL CA: CPT

## 2018-11-09 PROCEDURE — 71046 X-RAY EXAM CHEST 2 VIEWS: CPT

## 2018-11-10 LAB
ATRIAL RATE: 60 BPM
CALCULATED P AXIS, ECG09: 48 DEGREES
CALCULATED R AXIS, ECG10: 57 DEGREES
CALCULATED T AXIS, ECG11: 37 DEGREES
DIAGNOSIS, 93000: NORMAL
P-R INTERVAL, ECG05: 212 MS
Q-T INTERVAL, ECG07: 386 MS
QRS DURATION, ECG06: 90 MS
QTC CALCULATION (BEZET), ECG08: 386 MS
VENTRICULAR RATE, ECG03: 60 BPM

## 2018-11-13 ENCOUNTER — OFFICE VISIT (OUTPATIENT)
Dept: ORTHOPEDIC SURGERY | Facility: CLINIC | Age: 68
End: 2018-11-13

## 2018-11-13 VITALS
WEIGHT: 195 LBS | HEART RATE: 75 BPM | HEIGHT: 68 IN | SYSTOLIC BLOOD PRESSURE: 162 MMHG | TEMPERATURE: 97.3 F | OXYGEN SATURATION: 97 % | DIASTOLIC BLOOD PRESSURE: 90 MMHG | RESPIRATION RATE: 16 BRPM | BODY MASS INDEX: 29.55 KG/M2

## 2018-11-13 DIAGNOSIS — G89.18 ACUTE POST-OPERATIVE PAIN: Primary | ICD-10-CM

## 2018-11-13 RX ORDER — OXYCODONE AND ACETAMINOPHEN 5; 325 MG/1; MG/1
1-2 TABLET ORAL
Qty: 28 TAB | Refills: 0 | Status: SHIPPED | OUTPATIENT
Start: 2018-11-13

## 2018-11-13 NOTE — PROGRESS NOTES
Skye Gandhi returns for History and Physical in preparation of upcoming right ring finger trigger release. Please see the attached forms in Epic for History, Physical, and Review of systems.

## 2018-11-13 NOTE — H&P
History and Physical    70 year AA male seen in preparation for right hand open ring finger trigger release  Review of Systems   Constitutional: Positive for activity change (decreased use right hand secondary to trigger ring finger). Negative for chills and fatigue. HENT: Negative for ear pain. Eyes: Negative for pain. Respiratory: Negative for shortness of breath. Cardiovascular: Negative. Gastrointestinal: Negative for nausea and vomiting. Musculoskeletal: Positive for joint swelling and myalgias. Right hand   Skin: Negative. Allergic/Immunologic: Negative. Neurological: Negative. Hematological: Negative. Psychiatric/Behavioral: Negative. Physical Exam   Nursing note and vitals reviewed. Constitutional: He is oriented to person, place, and time. He appears well-developed and well-nourished. HENT:   Head: Normocephalic and atraumatic. Eyes: Conjunctivae and EOM are normal. Pupils are equal, round, and reactive to light. Neck: Normal range of motion. Cardiovascular: Normal rate, regular rhythm, normal heart sounds and intact distal pulses. Pulmonary/Chest: Effort normal and breath sounds normal.   Musculoskeletal:        Right hand: He exhibits decreased range of motion and tenderness. Hands:  Neurological: He is alert and oriented to person, place, and time. Skin: Skin is warm, dry and intact. Psychiatric: He has a normal mood and affect. His behavior is normal. Judgment and thought content normal.     Trigger finger right ring finger    Open trigger release right ring finger    Risk / Benefit: Surgeon will discuss in \"face to face\" encounter on day of surgery. Family History and Surgical History reviewed, discussed in detail, and deemed Non-Contributory in preparation for this surgical encounter.

## 2018-11-16 ENCOUNTER — OFFICE VISIT (OUTPATIENT)
Dept: ORTHOPEDIC SURGERY | Facility: CLINIC | Age: 68
End: 2018-11-16

## 2018-11-16 VITALS
OXYGEN SATURATION: 100 % | RESPIRATION RATE: 16 BRPM | HEIGHT: 68 IN | SYSTOLIC BLOOD PRESSURE: 191 MMHG | DIASTOLIC BLOOD PRESSURE: 102 MMHG | BODY MASS INDEX: 29.7 KG/M2 | WEIGHT: 196 LBS | TEMPERATURE: 95.4 F | HEART RATE: 60 BPM

## 2018-11-16 DIAGNOSIS — Z98.890 S/P TRIGGER FINGER RELEASE: ICD-10-CM

## 2018-11-16 DIAGNOSIS — G89.18 ACUTE POST-OPERATIVE PAIN: Primary | ICD-10-CM

## 2018-11-16 RX ORDER — HYDROCHLOROTHIAZIDE 12.5 MG/1
12.5 CAPSULE ORAL
COMMUNITY
Start: 2018-11-15

## 2018-11-16 NOTE — PROGRESS NOTES
HISTORY OF PRESENT ILLNESS:  Amina Reddy returns two days status post his right ring finger open trigger release. He is doing well today. PHYSICAL EXAM:  The surgical site is intact over the volar aspect of the right hand transverse measuring 3 cm located 2.5 cm proximal from the ring finger metacarpophalangeal joint. The patient is guarded on his flexion and extension lacking 4 cm palm-to-pulp on flexion and at the metacarpophalangeal joint of the right ring finger lacking 30° to full extension. Distal sensation is intact fully to the right upper extremity. Capillary refill is brisk and less than 2 seconds to the right upper extremity. The patient does have a manual blood pressure of 191/102 today. PLAN:   He is going to followup with his PCP. Otherwise, he is going to avoid getting the surgical site wet associated with the right hand. He will follow up in one week for a wound check and likely suture removal.  The OP report was unavailable for recommend or comment today. All his questions were answered to his satisfaction.

## 2018-11-26 ENCOUNTER — OFFICE VISIT (OUTPATIENT)
Dept: ORTHOPEDIC SURGERY | Facility: CLINIC | Age: 68
End: 2018-11-26

## 2018-11-26 VITALS
TEMPERATURE: 96.5 F | SYSTOLIC BLOOD PRESSURE: 145 MMHG | WEIGHT: 194 LBS | HEART RATE: 84 BPM | RESPIRATION RATE: 16 BRPM | BODY MASS INDEX: 29.4 KG/M2 | DIASTOLIC BLOOD PRESSURE: 86 MMHG | HEIGHT: 68 IN | OXYGEN SATURATION: 99 %

## 2018-11-26 DIAGNOSIS — M65.341 TRIGGER RING FINGER OF RIGHT HAND: ICD-10-CM

## 2018-11-26 DIAGNOSIS — Z98.890 S/P TRIGGER FINGER RELEASE: Primary | ICD-10-CM

## 2018-11-26 RX ORDER — SULFAMETHOXAZOLE AND TRIMETHOPRIM 800; 160 MG/1; MG/1
1 TABLET ORAL 2 TIMES DAILY
Qty: 10 TAB | Refills: 0 | Status: SHIPPED | OUTPATIENT
Start: 2018-11-26

## 2018-11-26 NOTE — PROGRESS NOTES
HISTORY OF PRESENT ILLNESS:  Juana Leija returns. He is now 12 days status post his right hand ring finger open trigger release. PHYSICAL EXAM:  The surgical site is maturing nicely. The sutures are tight and need to be removed today. All were removed with no complications. The superficial exposed skin reveals some gapping, about 2 millimeters, but the subcutaneous tissue has healed nicely. He does have some trace bleeding through the center portion of his surgical site today. PLAN:   I am going to empirically place him on a little Bactrim DS, one po b.i.d., to cover him just in case, as the hand is still maturing relative to the incision site. We will see him back on Friday. The area was Steri-Stripped today. A sterile Band-Aid was placed, and he was placed in a volar splint. He will limit his push, pull, lift, and carry to no more than one pound.

## 2018-11-30 ENCOUNTER — OFFICE VISIT (OUTPATIENT)
Dept: ORTHOPEDIC SURGERY | Facility: CLINIC | Age: 68
End: 2018-11-30

## 2018-11-30 VITALS
HEART RATE: 113 BPM | BODY MASS INDEX: 29.7 KG/M2 | WEIGHT: 196 LBS | TEMPERATURE: 97.1 F | HEIGHT: 68 IN | SYSTOLIC BLOOD PRESSURE: 129 MMHG | DIASTOLIC BLOOD PRESSURE: 83 MMHG | RESPIRATION RATE: 16 BRPM | OXYGEN SATURATION: 97 %

## 2018-11-30 DIAGNOSIS — Z98.890 S/P TRIGGER FINGER RELEASE: Primary | ICD-10-CM

## 2018-11-30 DIAGNOSIS — T81.31XD DEHISCENCE OF OPERATIVE WOUND, SUBSEQUENT ENCOUNTER: ICD-10-CM

## 2018-11-30 NOTE — PROGRESS NOTES
Patient returns for follow-up regarding his recent right hand trigger release. He is found to have a early wound dehiscence and treated appropriately with antibiotics and Steri-Strips. Examination of the right volar hand in the area of the surgical site associated with the trigger release which reveals moderate improvement in the surgical site with granulation tissue noted throughout. No evidence of any further skin breakdown. No drainage no evidence of malodor. At this point he is going to hold off on getting the surgical site wet for another couple days. No occupational therapy is necessary at this time. We will see him back on a as needed basis. Today all of his questions were answered to his satisfaction.

## 2019-01-04 ENCOUNTER — OFFICE VISIT (OUTPATIENT)
Dept: ORTHOPEDIC SURGERY | Facility: CLINIC | Age: 69
End: 2019-01-04

## 2019-01-04 VITALS
BODY MASS INDEX: 29.95 KG/M2 | DIASTOLIC BLOOD PRESSURE: 99 MMHG | HEIGHT: 68 IN | WEIGHT: 197.6 LBS | TEMPERATURE: 96.4 F | HEART RATE: 57 BPM | RESPIRATION RATE: 18 BRPM | SYSTOLIC BLOOD PRESSURE: 174 MMHG | OXYGEN SATURATION: 98 %

## 2019-01-04 DIAGNOSIS — Z48.02 ENCOUNTER FOR REMOVAL OF SUTURES: ICD-10-CM

## 2019-01-04 DIAGNOSIS — Z98.890 S/P TRIGGER FINGER RELEASE: Primary | ICD-10-CM

## 2019-01-04 DIAGNOSIS — T81.31XD DEHISCENCE OF OPERATIVE WOUND, SUBSEQUENT ENCOUNTER: ICD-10-CM

## 2019-01-04 NOTE — PROGRESS NOTES
HISTORY OF PRESENT ILLNESS:  Ravi Morris returns. He is just five weeks status post his right hand, ring finger trigger release. He had a wound dehiscence postoperatively and was treated accordingly. PHYSICAL EXAM:  The wound has matured nicely at this point. He has no triggering of the hand reported. The patient does have a retained nylon suture, which was removed today with no complications. PLAN:   Today, we discussed trigger finger of the opposite hand and all his questions were answered to his satisfaction. he is going to follow back with our office on a prn basis.

## 2019-05-06 ENCOUNTER — OFFICE VISIT (OUTPATIENT)
Dept: ORTHOPEDIC SURGERY | Age: 69
End: 2019-05-06

## 2019-05-06 VITALS
HEIGHT: 68 IN | DIASTOLIC BLOOD PRESSURE: 118 MMHG | WEIGHT: 195.2 LBS | OXYGEN SATURATION: 99 % | BODY MASS INDEX: 29.58 KG/M2 | TEMPERATURE: 98 F | SYSTOLIC BLOOD PRESSURE: 208 MMHG | RESPIRATION RATE: 15 BRPM | HEART RATE: 60 BPM

## 2019-05-06 DIAGNOSIS — M54.16 LUMBAR RADICULOPATHY: Primary | ICD-10-CM

## 2019-05-06 DIAGNOSIS — M48.062 SPINAL STENOSIS OF LUMBAR REGION WITH NEUROGENIC CLAUDICATION: ICD-10-CM

## 2019-05-06 NOTE — PROGRESS NOTES
Chief complaint/History of Present Illness:  Chief Complaint   Patient presents with    Back Pain     Lumbar    Buttocks pain     Right    Leg Pain     Right    Follow-up     HPI  Guzman Harris is a  78 y.o.  male      HISTORY OF PRESENT ILLNESS:  The patient comes in today. We have not seen him since March 26, 2018. He states he was doing okay. He did have a block done October 25, 2017, which was a right L5-S1 selective nerve root block, which did help up until now. He states about one month ago, he started having recurrent right buttock pain and thigh numbness off an on. About a week ago, he turned in bed and had horrible pain. His PCP put him on 800 mg Ibuprofen, but his blood pressure today is 208/118. I am thinking that may have increased his blood pressure. He has a known L4-5 disc protrusion and spondylosis and L5-S1 canal stenosis with severe foraminal stenosis on the right and the left at L5-S1. He denies fever and bowel and bladder dysfunction. He is retired. He is a nonsmoker. PHYSICAL EXAM:  Mr. Ian Banuelos is a 79-year-old male. He is alert and oriented. He has a normal mood and affect. He has a full weightbearing, non-antalgic gait using no assistive device. He has 5/5 strength of his bilateral lower extremities and negative straight leg raise. He has some pain with hyperextension of the lumbar spine. His blood pressure today is 208/118. ASSESSMENT/PLAN:  This is a patient with lumbar spinal stenosis. We are going to repeat the block, but he will need to get his blood pressure down first.  Repeat blood pressure is 180/90, which is much better. We are going to set him up for the repeat block. We will see him back after the block.        Review of systems:    Past Medical History:   Diagnosis Date    Gout     High cholesterol     Hypertension      Past Surgical History:   Procedure Laterality Date    HX GI      hemorroidectomy    HX OTHER SURGICAL      back surgery bulged disc     Social History     Socioeconomic History    Marital status:      Spouse name: Not on file    Number of children: Not on file    Years of education: Not on file    Highest education level: Not on file   Occupational History    Not on file   Social Needs    Financial resource strain: Not on file    Food insecurity:     Worry: Not on file     Inability: Not on file    Transportation needs:     Medical: Not on file     Non-medical: Not on file   Tobacco Use    Smoking status: Former Smoker     Last attempt to quit: 1987     Years since quittin.3    Smokeless tobacco: Never Used   Substance and Sexual Activity    Alcohol use: No    Drug use: No    Sexual activity: Not on file   Lifestyle    Physical activity:     Days per week: Not on file     Minutes per session: Not on file    Stress: Not on file   Relationships    Social connections:     Talks on phone: Not on file     Gets together: Not on file     Attends Restorationist service: Not on file     Active member of club or organization: Not on file     Attends meetings of clubs or organizations: Not on file     Relationship status: Not on file    Intimate partner violence:     Fear of current or ex partner: Not on file     Emotionally abused: Not on file     Physically abused: Not on file     Forced sexual activity: Not on file   Other Topics Concern    Not on file   Social History Narrative    Not on file     No family history on file. Physical Exam:  Visit Vitals  BP (!) 208/118   Pulse 60   Temp 98 °F (36.7 °C)   Resp 15   Ht 5' 8\" (1.727 m)   Wt 195 lb 3.2 oz (88.5 kg)   SpO2 99%   BMI 29.68 kg/m²     Pain Scale: 10 - Worst pain ever/10          has been . reviewed and is appropriate          Diagnoses and all orders for this visit:    1.  Lumbar radiculopathy  -     SCHEDULE SURGERY    2. Spinal stenosis of lumbar region with neurogenic claudication  -     SCHEDULE SURGERY            Follow-up and Dispositions · Return for after block.              We have informed [facility] to notify us for immediate appointment if he has any worsening neurogical symptoms or if an emergency situation presents, then call [phone]

## 2022-03-18 PROBLEM — M54.16 LUMBAR RADICULOPATHY: Status: ACTIVE | Noted: 2017-10-25

## 2025-01-23 ENCOUNTER — HOSPITAL ENCOUNTER (OUTPATIENT)
Facility: HOSPITAL | Age: 75
Discharge: HOME | End: 2025-01-26
Payer: OTHER GOVERNMENT

## 2025-01-23 DIAGNOSIS — R97.20 ELEVATED PROSTATE SPECIFIC ANTIGEN (PSA): ICD-10-CM

## 2025-01-23 LAB — CREAT UR-MCNC: 1.6 MG/DL (ref 0.6–1.3)

## 2025-01-23 PROCEDURE — A9575 INJ GADOTERATE MEGLUMI 0.1ML: HCPCS | Performed by: PHYSICIAN ASSISTANT

## 2025-01-23 PROCEDURE — 6360000004 HC RX CONTRAST MEDICATION: Performed by: PHYSICIAN ASSISTANT

## 2025-01-23 PROCEDURE — 82565 ASSAY OF CREATININE: CPT

## 2025-01-23 PROCEDURE — 72197 MRI PELVIS W/O & W/DYE: CPT

## 2025-01-23 RX ADMIN — GADOTERATE MEGLUMINE 16 ML: 376.9 INJECTION INTRAVENOUS at 13:37

## 2025-01-27 ENCOUNTER — HOSPITAL ENCOUNTER (OUTPATIENT)
Facility: HOSPITAL | Age: 75
Discharge: HOME | End: 2025-01-30
Payer: COMMERCIAL

## 2025-01-27 DIAGNOSIS — R97.20 ELEVATED PROSTATE SPECIFIC ANTIGEN (PSA): ICD-10-CM

## 2025-01-27 PROCEDURE — 72197 MRI PELVIS W/O & W/DYE: CPT

## 2025-01-27 PROCEDURE — A9577 INJ MULTIHANCE: HCPCS | Performed by: PHYSICIAN ASSISTANT

## 2025-01-27 PROCEDURE — 6360000004 HC RX CONTRAST MEDICATION: Performed by: PHYSICIAN ASSISTANT

## 2025-01-27 RX ADMIN — GADOBENATE DIMEGLUMINE 15 ML: 529 INJECTION, SOLUTION INTRAVENOUS at 13:25

## (undated) DEVICE — (D)NDL SPNE 22GX15CM -- DISC BY MFR W/NO SUB

## (undated) DEVICE — (D)BNDG ADHESIVE FABRIC 3/4X3 -- DISC BY MFR USE ITEM 357960

## (undated) DEVICE — MEDIA CONTRAST 10ML 200MG/ML 41%

## (undated) DEVICE — NDL SPNE MANAN 22GX6IN --

## (undated) DEVICE — TRAY MYEL SFTY +

## (undated) DEVICE — (D)SYR 10ML 1/5ML GRAD NSAF -- PKGING CHANGE USE ITEM 338027